# Patient Record
Sex: FEMALE | Race: WHITE | NOT HISPANIC OR LATINO | Employment: OTHER | URBAN - METROPOLITAN AREA
[De-identification: names, ages, dates, MRNs, and addresses within clinical notes are randomized per-mention and may not be internally consistent; named-entity substitution may affect disease eponyms.]

---

## 2019-10-30 DIAGNOSIS — R94.31 NONSPECIFIC ABNORMAL ELECTROCARDIOGRAM (ECG) (EKG): ICD-10-CM

## 2019-10-30 DIAGNOSIS — I44.7 COMPLETE LEFT BUNDLE BRANCH BLOCK: ICD-10-CM

## 2019-10-30 DIAGNOSIS — I10 ESSENTIAL HYPERTENSION, MALIGNANT: Primary | ICD-10-CM

## 2019-10-30 DIAGNOSIS — E78.5 HYPERLIPEMIA: ICD-10-CM

## 2019-11-07 ENCOUNTER — CLINICAL SUPPORT (OUTPATIENT)
Dept: CARDIOLOGY | Facility: HOSPITAL | Age: 69
End: 2019-11-07
Attending: INTERNAL MEDICINE
Payer: MEDICARE

## 2019-11-07 DIAGNOSIS — R94.31 NONSPECIFIC ABNORMAL ELECTROCARDIOGRAM (ECG) (EKG): ICD-10-CM

## 2019-11-07 DIAGNOSIS — I10 ESSENTIAL HYPERTENSION, MALIGNANT: ICD-10-CM

## 2019-11-07 DIAGNOSIS — E78.5 HYPERLIPEMIA: ICD-10-CM

## 2019-11-07 DIAGNOSIS — I44.7 COMPLETE LEFT BUNDLE BRANCH BLOCK: ICD-10-CM

## 2019-11-07 PROCEDURE — 93306 TTE W/DOPPLER COMPLETE: CPT

## 2019-11-12 ENCOUNTER — TELEPHONE (OUTPATIENT)
Dept: CARDIOLOGY | Facility: HOSPITAL | Age: 69
End: 2019-11-12

## 2019-11-12 RX ORDER — ATORVASTATIN CALCIUM 20 MG/1
20 TABLET, FILM COATED ORAL DAILY
COMMUNITY
End: 2022-07-20 | Stop reason: SDUPTHER

## 2019-11-12 RX ORDER — LABETALOL 100 MG/1
200 TABLET, FILM COATED ORAL 2 TIMES DAILY
COMMUNITY
End: 2021-01-06 | Stop reason: SDUPTHER

## 2019-11-12 RX ORDER — IRBESARTAN 300 MG/1
300 TABLET ORAL DAILY
COMMUNITY
End: 2021-01-06 | Stop reason: SDUPTHER

## 2019-11-12 RX ORDER — FUROSEMIDE 20 MG/1
20 TABLET ORAL DAILY
COMMUNITY
End: 2022-07-20 | Stop reason: SDUPTHER

## 2019-11-12 RX ORDER — LORATADINE 10 MG/1
10 TABLET ORAL DAILY
COMMUNITY

## 2019-11-12 RX ORDER — GUAIFENESIN 600 MG/1
1200 TABLET, EXTENDED RELEASE ORAL DAILY PRN
COMMUNITY

## 2019-11-12 RX ORDER — TRAZODONE HYDROCHLORIDE 50 MG/1
50 TABLET ORAL NIGHTLY PRN
COMMUNITY
End: 2024-01-31 | Stop reason: SDUPTHER

## 2019-11-12 RX ORDER — AZELASTINE 1 MG/ML
1 SPRAY, METERED NASAL 2 TIMES DAILY
COMMUNITY
End: 2023-08-14

## 2019-11-12 RX ORDER — POLYETHYLENE GLYCOL 3350 17 G/17G
17 POWDER, FOR SOLUTION ORAL DAILY
COMMUNITY

## 2019-11-12 RX ORDER — OMEPRAZOLE 20 MG/1
20 CAPSULE, DELAYED RELEASE ORAL DAILY
COMMUNITY
End: 2024-01-31 | Stop reason: SDUPTHER

## 2019-11-13 ENCOUNTER — CLINICAL SUPPORT (OUTPATIENT)
Dept: CARDIOLOGY | Facility: HOSPITAL | Age: 69
End: 2019-11-13
Attending: INTERNAL MEDICINE
Payer: MEDICARE

## 2019-11-13 ENCOUNTER — HOSPITAL ENCOUNTER (OUTPATIENT)
Dept: RADIOLOGY | Facility: HOSPITAL | Age: 69
Discharge: HOME | End: 2019-11-13
Attending: INTERNAL MEDICINE
Payer: MEDICARE

## 2019-11-13 DIAGNOSIS — I10 ESSENTIAL HYPERTENSION, MALIGNANT: ICD-10-CM

## 2019-11-13 DIAGNOSIS — R94.31 NONSPECIFIC ABNORMAL ELECTROCARDIOGRAM (ECG) (EKG): ICD-10-CM

## 2019-11-13 DIAGNOSIS — I44.7 COMPLETE LEFT BUNDLE BRANCH BLOCK: ICD-10-CM

## 2019-11-13 DIAGNOSIS — E78.5 HYPERLIPEMIA: ICD-10-CM

## 2019-11-13 LAB
CV PHARM DOSE: 0.4 MG
CV STRESS BASE HR: 69 BPM
DIASTOLIC BLOOD PRESSURE: 72 MMHG
OHS CV CPX 85 PERCENT MAX PREDICTED HEART RATE MALE: 125
OHS CV CPX MAX PREDICTED HEART RATE: 147
OHS CV CPX PATIENT IS FEMALE: 1
OHS CV CPX PATIENT IS MALE: 0
OHS CV CPX PEAK DIASTOLIC BLOOD PRESSURE: 60 MMHG
OHS CV CPX PEAK HEAR RATE: 106 BPM
OHS CV CPX PEAK RATE PRESSURE PRODUCT: NORMAL
OHS CV CPX PEAK SYSTOLIC BLOOD PRESSURE: 172 MMHG
OHS CV CPX PERCENT MAX PREDICTED HEART RATE ACHIEVED: 72
OHS CV CPX RATE PRESSURE PRODUCT PRESENTING: NORMAL
STRESS ECHO TARGET HR: 130 BPM
SYSTOLIC BLOOD PRESSURE: 188 MMHG

## 2019-11-13 PROCEDURE — 93017 CV STRESS TEST TRACING ONLY: CPT

## 2019-11-13 PROCEDURE — 63600175 PHARM REV CODE 636 W HCPCS: Performed by: INTERNAL MEDICINE

## 2019-11-13 PROCEDURE — A9502 TC99M TETROFOSMIN: HCPCS

## 2019-11-13 RX ORDER — REGADENOSON 0.08 MG/ML
0.4 INJECTION, SOLUTION INTRAVENOUS ONCE
Status: COMPLETED | OUTPATIENT
Start: 2019-11-13 | End: 2019-11-13

## 2019-11-13 RX ADMIN — REGADENOSON 0.4 MG: 0.08 INJECTION, SOLUTION INTRAVENOUS at 10:11

## 2019-11-22 LAB
AORTIC ROOT ANNULUS: 2.6 CM
AORTIC VALVE CUSP SEPERATION: 1.9 CM
AV INDEX (PROSTH): 0.83
AV MEAN GRADIENT: 4 MMHG
AV PEAK GRADIENT: 7 MMHG
AV VALVE AREA: 2.7 CM2
AV VELOCITY RATIO: 78.57
CV ECHO LV RWT: 0.5 CM
DOP CALC AO PEAK VEL: 1.33 M/S
DOP CALC AO VTI: 31.7 CM
DOP CALC LVOT AREA: 3.3 CM2
DOP CALC LVOT DIAMETER: 2.04 CM
DOP CALC LVOT PEAK VEL: 104.5 M/S
DOP CALC LVOT STROKE VOLUME: 85.66 CM3
DOP CALCLVOT PEAK VEL VTI: 26.22 CM
E WAVE DECELERATION TIME: 182.33 MSEC
E/A RATIO: 1.12
E/E' RATIO: 8.59 M/S
ECHO LV POSTERIOR WALL: 1.16 CM (ref 0.6–1.1)
FRACTIONAL SHORTENING: 28 % (ref 28–44)
INTERVENTRICULAR SEPTUM: 1.15 CM (ref 0.6–1.1)
IVRT: 74.59 MSEC
LEFT ATRIUM SIZE: 3.93 CM
LEFT INTERNAL DIMENSION IN SYSTOLE: 3.35 CM (ref 2.1–4)
LEFT VENTRICULAR INTERNAL DIMENSION IN DIASTOLE: 4.68 CM (ref 3.5–6)
LEFT VENTRICULAR MASS: 199.47 G
LV LATERAL E/E' RATIO: 6.64 M/S
LV SEPTAL E/E' RATIO: 12.17 M/S
MV PEAK A VEL: 0.65 M/S
MV PEAK E VEL: 0.73 M/S
PISA TR MAX VEL: 2.64 M/S
PV PEAK VELOCITY: 129.56 CM/S
RA PRESSURE: 3 MMHG
RIGHT VENTRICULAR END-DIASTOLIC DIMENSION: 225 CM
TDI LATERAL: 0.11 M/S
TDI SEPTAL: 0.06 M/S
TDI: 0.09 M/S
TR MAX PG: 28 MMHG
TV REST PULMONARY ARTERY PRESSURE: 31 MMHG

## 2019-12-26 DIAGNOSIS — Z12.31 ENCOUNTER FOR SCREENING MAMMOGRAM FOR MALIGNANT NEOPLASM OF BREAST: Primary | ICD-10-CM

## 2020-01-17 ENCOUNTER — HOSPITAL ENCOUNTER (OUTPATIENT)
Dept: RADIOLOGY | Facility: HOSPITAL | Age: 70
Discharge: HOME | End: 2020-01-17
Attending: SPECIALIST
Payer: MEDICARE

## 2020-01-17 DIAGNOSIS — Z12.31 ENCOUNTER FOR SCREENING MAMMOGRAM FOR MALIGNANT NEOPLASM OF BREAST: ICD-10-CM

## 2020-01-17 PROCEDURE — 77067 SCR MAMMO BI INCL CAD: CPT | Mod: TC,PO

## 2020-09-23 ENCOUNTER — TELEPHONE (OUTPATIENT)
Dept: CARDIOLOGY | Facility: CLINIC | Age: 70
End: 2020-09-23

## 2020-09-23 NOTE — TELEPHONE ENCOUNTER
----- Message from Norma Hirsch sent at 9/23/2020 11:54 AM CDT -----  Regarding: need today  Stress and ekg needed to send to pre admit   Need today     Fax [phone]

## 2021-01-06 ENCOUNTER — OFFICE VISIT (OUTPATIENT)
Dept: CARDIOLOGY | Facility: CLINIC | Age: 71
End: 2021-01-06
Payer: MEDICARE

## 2021-01-06 VITALS
WEIGHT: 188 LBS | RESPIRATION RATE: 16 BRPM | BODY MASS INDEX: 36.91 KG/M2 | DIASTOLIC BLOOD PRESSURE: 64 MMHG | OXYGEN SATURATION: 96 % | SYSTOLIC BLOOD PRESSURE: 124 MMHG | HEIGHT: 60 IN | HEART RATE: 67 BPM

## 2021-01-06 DIAGNOSIS — I10 ESSENTIAL HYPERTENSION: ICD-10-CM

## 2021-01-06 DIAGNOSIS — I10 HYPERTENSION, UNSPECIFIED TYPE: Primary | ICD-10-CM

## 2021-01-06 DIAGNOSIS — E78.5 DYSLIPIDEMIA: ICD-10-CM

## 2021-01-06 PROCEDURE — 99214 PR OFFICE/OUTPT VISIT, EST, LEVL IV, 30-39 MIN: ICD-10-PCS | Mod: S$GLB,,, | Performed by: INTERNAL MEDICINE

## 2021-01-06 PROCEDURE — 99214 OFFICE O/P EST MOD 30 MIN: CPT | Mod: S$GLB,,, | Performed by: INTERNAL MEDICINE

## 2021-01-06 RX ORDER — LABETALOL 100 MG/1
200 TABLET, FILM COATED ORAL 2 TIMES DAILY
Qty: 180 TABLET | Refills: 3 | Status: SHIPPED | OUTPATIENT
Start: 2021-01-06 | End: 2021-01-07

## 2021-01-06 RX ORDER — IRBESARTAN 300 MG/1
300 TABLET ORAL DAILY
Qty: 90 TABLET | Refills: 3 | Status: SHIPPED | OUTPATIENT
Start: 2021-01-06 | End: 2022-07-20 | Stop reason: SDUPTHER

## 2021-01-07 RX ORDER — LABETALOL 200 MG/1
200 TABLET, FILM COATED ORAL 2 TIMES DAILY
COMMUNITY
Start: 2020-12-16 | End: 2021-01-07

## 2021-01-08 RX ORDER — LABETALOL 200 MG/1
200 TABLET, FILM COATED ORAL 2 TIMES DAILY
Qty: 180 TABLET | Refills: 3 | Status: SHIPPED | OUTPATIENT
Start: 2021-01-08 | End: 2022-07-20 | Stop reason: SDUPTHER

## 2021-01-14 DIAGNOSIS — Z12.31 SCREENING MAMMOGRAM FOR HIGH-RISK PATIENT: Primary | ICD-10-CM

## 2021-01-22 ENCOUNTER — HOSPITAL ENCOUNTER (OUTPATIENT)
Dept: RADIOLOGY | Facility: HOSPITAL | Age: 71
Discharge: HOME | End: 2021-01-22
Attending: SPECIALIST
Payer: MEDICARE

## 2021-01-22 DIAGNOSIS — Z12.31 SCREENING MAMMOGRAM FOR HIGH-RISK PATIENT: ICD-10-CM

## 2021-01-22 PROCEDURE — 77067 SCR MAMMO BI INCL CAD: CPT | Mod: TC,PO

## 2021-07-06 ENCOUNTER — TELEPHONE (OUTPATIENT)
Dept: CARDIOLOGY | Facility: CLINIC | Age: 71
End: 2021-07-06

## 2021-07-14 ENCOUNTER — OFFICE VISIT (OUTPATIENT)
Dept: CARDIOLOGY | Facility: CLINIC | Age: 71
End: 2021-07-14
Payer: MEDICARE

## 2021-07-14 VITALS
HEIGHT: 60 IN | DIASTOLIC BLOOD PRESSURE: 60 MMHG | BODY MASS INDEX: 38.09 KG/M2 | SYSTOLIC BLOOD PRESSURE: 122 MMHG | WEIGHT: 194 LBS | HEART RATE: 60 BPM

## 2021-07-14 DIAGNOSIS — K21.9 GASTROESOPHAGEAL REFLUX DISEASE, UNSPECIFIED WHETHER ESOPHAGITIS PRESENT: ICD-10-CM

## 2021-07-14 DIAGNOSIS — R94.31 NONSPECIFIC ABNORMAL ELECTROCARDIOGRAM (ECG) (EKG): ICD-10-CM

## 2021-07-14 DIAGNOSIS — E78.5 DYSLIPIDEMIA: ICD-10-CM

## 2021-07-14 DIAGNOSIS — I10 ESSENTIAL HYPERTENSION: Primary | ICD-10-CM

## 2021-07-14 PROCEDURE — 99213 PR OFFICE/OUTPT VISIT, EST, LEVL III, 20-29 MIN: ICD-10-PCS | Mod: S$GLB,,, | Performed by: INTERNAL MEDICINE

## 2021-07-14 PROCEDURE — 99213 OFFICE O/P EST LOW 20 MIN: CPT | Mod: S$GLB,,, | Performed by: INTERNAL MEDICINE

## 2021-07-14 RX ORDER — LANOLIN ALCOHOL/MO/W.PET/CERES
400 CREAM (GRAM) TOPICAL DAILY
COMMUNITY

## 2022-01-03 ENCOUNTER — TELEPHONE (OUTPATIENT)
Dept: CARDIOLOGY | Facility: CLINIC | Age: 72
End: 2022-01-03
Payer: MEDICARE

## 2022-01-03 NOTE — TELEPHONE ENCOUNTER
----- Message from Sky Carrington sent at 1/3/2022 10:45 AM CST -----  Contact: Self  Pt would like to see Cindi WHITE instead of him. Can you guys try to change her to see his NP please. Please call pt back at [phone] to update and advise.

## 2022-01-18 ENCOUNTER — TELEPHONE (OUTPATIENT)
Dept: OTOLARYNGOLOGY | Facility: CLINIC | Age: 72
End: 2022-01-18
Payer: MEDICARE

## 2022-01-18 DIAGNOSIS — Z12.31 ENCOUNTER FOR SCREENING MAMMOGRAM FOR MALIGNANT NEOPLASM OF BREAST: Primary | ICD-10-CM

## 2022-01-19 ENCOUNTER — OFFICE VISIT (OUTPATIENT)
Dept: CARDIOLOGY | Facility: CLINIC | Age: 72
End: 2022-01-19
Payer: MEDICARE

## 2022-01-19 VITALS
BODY MASS INDEX: 37.5 KG/M2 | HEART RATE: 80 BPM | DIASTOLIC BLOOD PRESSURE: 80 MMHG | HEIGHT: 60 IN | WEIGHT: 191 LBS | SYSTOLIC BLOOD PRESSURE: 130 MMHG

## 2022-01-19 DIAGNOSIS — E78.5 DYSLIPIDEMIA: ICD-10-CM

## 2022-01-19 DIAGNOSIS — R94.31 NONSPECIFIC ABNORMAL ELECTROCARDIOGRAM (ECG) (EKG): ICD-10-CM

## 2022-01-19 DIAGNOSIS — K21.9 GASTROESOPHAGEAL REFLUX DISEASE WITHOUT ESOPHAGITIS: ICD-10-CM

## 2022-01-19 DIAGNOSIS — I10 ESSENTIAL HYPERTENSION: ICD-10-CM

## 2022-01-19 PROCEDURE — 99213 PR OFFICE/OUTPT VISIT, EST, LEVL III, 20-29 MIN: ICD-10-PCS | Mod: S$GLB,,, | Performed by: INTERNAL MEDICINE

## 2022-01-19 PROCEDURE — 99213 OFFICE O/P EST LOW 20 MIN: CPT | Mod: S$GLB,,, | Performed by: INTERNAL MEDICINE

## 2022-01-19 NOTE — ASSESSMENT & PLAN NOTE
She is taking Prilosec 20 mg daily she is also on Glycolax encouraged her to continue and be regimented.

## 2022-01-19 NOTE — ASSESSMENT & PLAN NOTE
Blood pressure is reasonably well controlled at 130/80 mmHg continue on Lasix 20 mg a day, Avapro 300 mg daily, labetalol 200 mg twice daily heart rate is 80 beats per minute should there be in need to adjust her medications she may consider increasing labetalol to 300 mg twice daily in the future however currently she is in recent range less continue the same dosing.

## 2022-01-19 NOTE — ASSESSMENT & PLAN NOTE
Maintain on low-fat low-cholesterol diet she is due to get some more blood work done the new future.  And in the meanwhile continue Lipitor 20 mg a day

## 2022-01-19 NOTE — PROGRESS NOTES
Subjective:    Patient ID:  Erlinda Reaves is a 71 y.o. female patient here for evaluation Hypertension      History of Present Illness:   Patient is here for follow-up evaluation she had a celebration of her 71th birthday plus the holidays of Christmas and new year's she did have some dietary indiscretion.  She was seen by primary care physician noted to have some elevated blood pressure recommended her to consider starting on amlodipine.  However patient was reluctant to add more medicines to her regimen she kept a record of her blood pressures since then at home recording the blood pressure has been within normal range less than 135 at all times.  Patient denies having any cough or congestion no arm neck or jaw pain and no significant shortness of breath.  Since then she has started on weight watchers diet and she lost about 4 lb over the course no swelling in the lower extremities.          Review of patient's allergies indicates:   Allergen Reactions    Doxycycline Nausea Only       Past Medical History:   Diagnosis Date    Chronic sinusitis     Hyperlipemia     Hypertension     Osteoarthritis      Past Surgical History:   Procedure Laterality Date    HYSTERECTOMY      TONSILLECTOMY       Social History     Tobacco Use    Smoking status: Never Smoker    Smokeless tobacco: Never Used   Substance Use Topics    Alcohol use: Never    Drug use: Never        Review of Systems:    As noted in HPI in addition      REVIEW OF SYSTEMS  CARDIOVASCULAR: No recent chest pain, palpitations, arm, neck, or jaw pain  RESPIRATORY: No recent fever, cough chills, SOB or congestion  : No blood in the urine  GI: No Nausea, vomiting, constipation, diarrhea, blood, or reflux.  MUSCULOSKELETAL: No myalgias  NEURO: No lightheadedness or dizziness  EYES: No Double vision, blurry, vision or headache              Objective        Vitals:    01/19/22 1346   BP: 130/80   Pulse:        LIPIDS - LAST 2   No results found for:  CHOL, HDL, LDLCALC, TRIG, CHOLHDL    CBC - LAST 2  No results found for: WBC, RBC, HGB, HCT, MCV, MCH, MCHC, RDW, PLT, MPV, GRAN, LYMPH, MONO, BASO, NRBC    CHEMISTRY & LIVER FUNCTION - LAST 2  No results found for: NA, K, CL, CO2, ANIONGAP, BUN, CREATININE, GLU, CALCIUM, PH, MG, ALBUMIN, PROT, ALKPHOS, ALT, AST, BILITOT     CARDIAC PROFILE - LAST 2  No results found for: BNP, CPK, CPKMB, LDH, TROPONINI     COAGULATION - LAST 2  No results found for: LABPT, INR, APTT    ENDOCRINE & PSA - LAST 2  No results found for: HGBA1C, MICROALBUR, TSH, PROCAL, PSA     ECHOCARDIOGRAM RESULTS  Results for orders placed in visit on 11/07/19    Echo Color Flow Doppler? Yes    Interpretation Summary  · Normal left ventricular systolic function. The estimated ejection fraction is 60%  · Normal LV diastolic function.  · Mild concentric left ventricular hypertrophy.  · No wall motion abnormalities.  · Mild mitral regurgitation.  · Mild tricuspid regurgitation.  · Normal right ventricular systolic function.  · Mild left atrial enlargement.  · The estimated PA systolic pressure is 31 mm Hg      CURRENT/PREVIOUS VISIT EKG  No results found for this or any previous visit.  No valid procedures specified.   Results for orders placed in visit on 11/13/19    Treadmill Stress Test    Interpretation Summary    The EKG portion of this study is negative for ischemia.    The patient reported no chest pain during the stress test.    Arrhythmias during stress: rare PVCs.    The blood pressure response to stress was normal.    ECG Stress Nuclear portion of this study will be reported separately.    No valid procedures specified.    PHYSICAL EXAM  CONSTITUTIONAL: Well built, well nourished in no apparent distress  NECK: no carotid bruit, no JVD  LUNGS: CTA  CHEST WALL: no tenderness  HEART: regular rate and rhythm, S1, S2 normal, no murmur, click, rub or gallop   ABDOMEN: soft, non-tender; bowel sounds normal; no masses,  no  organomegaly  EXTREMITIES: Extremities normal, no edema, no calf tenderness noted  NEURO: AAO X 3    I HAVE REVIEWED :    The vital signs, nurses notes, and all the pertinent radiology and labs.        Current Outpatient Medications   Medication Instructions    atorvastatin (LIPITOR) 20 mg, Oral, Daily    azelastine (ASTELIN) 137 mcg (0.1 %) nasal spray 1 spray, Nasal, 2 times daily    furosemide (LASIX) 20 mg, Oral, Daily    guaiFENesin (MUCINEX) 1,200 mg, Oral, Daily    irbesartan (AVAPRO) 300 mg, Oral, Daily    labetaloL (NORMODYNE) 200 mg, Oral, 2 times daily    loratadine (CLARITIN) 10 mg, Oral, Daily    magnesium oxide (MAG-OX) 400 mg, Oral, Daily    omeprazole (PRILOSEC) 20 mg, Oral, Daily    polyethylene glycol (GLYCOLAX) 17 g, Oral, Daily    traZODone (DESYREL) 50 mg, Oral, Nightly PRN          Assessment & Plan     Essential hypertension  Blood pressure is reasonably well controlled at 130/80 mmHg continue on Lasix 20 mg a day, Avapro 300 mg daily, labetalol 200 mg twice daily heart rate is 80 beats per minute should there be in need to adjust her medications she may consider increasing labetalol to 300 mg twice daily in the future however currently she is in recent range less continue the same dosing.    Dyslipidemia  Maintain on low-fat low-cholesterol diet she is due to get some more blood work done the new future.  And in the meanwhile continue Lipitor 20 mg a day    Nonspecific abnormal electrocardiogram (ECG) (EKG)  No active cardiac symptoms are noted at this time    Gastroesophageal reflux disease  She is taking Prilosec 20 mg daily she is also on Glycolax encouraged her to continue and be regimented.          Follow up in about 6 months (around 7/19/2022).

## 2022-02-14 ENCOUNTER — HOSPITAL ENCOUNTER (OUTPATIENT)
Dept: RADIOLOGY | Facility: HOSPITAL | Age: 72
Discharge: HOME | End: 2022-02-14
Attending: INTERNAL MEDICINE
Payer: MEDICARE

## 2022-02-14 ENCOUNTER — TELEPHONE (OUTPATIENT)
Dept: CARDIOLOGY | Facility: CLINIC | Age: 72
End: 2022-02-14
Payer: MEDICARE

## 2022-02-14 VITALS — WEIGHT: 190.94 LBS | HEIGHT: 60 IN | BODY MASS INDEX: 37.49 KG/M2

## 2022-02-14 DIAGNOSIS — Z12.31 ENCOUNTER FOR SCREENING MAMMOGRAM FOR MALIGNANT NEOPLASM OF BREAST: ICD-10-CM

## 2022-02-14 PROCEDURE — 77063 BREAST TOMOSYNTHESIS BI: CPT | Mod: TC,PO

## 2022-02-14 PROCEDURE — 77067 SCR MAMMO BI INCL CAD: CPT | Mod: TC,PO

## 2022-02-14 NOTE — TELEPHONE ENCOUNTER
----- Message from Leonela Frausto sent at 2/14/2022 12:22 PM CST -----  Contact: Pt  Type:  Needs Medical Advice    Who Called: Pt  Symptoms (please be specific):   How long has patient had these symptoms:    Pharmacy name and phone #:   Would the patient rather a call back or a response via [facility]? Call back  Best Call Back Number: [phone]  Additional Information: Pt called to see if her lab results from Dr Lagunas was received, pt asked for a call back

## 2022-02-15 ENCOUNTER — TELEPHONE (OUTPATIENT)
Dept: CARDIOLOGY | Facility: CLINIC | Age: 72
End: 2022-02-15
Payer: MEDICARE

## 2022-02-15 NOTE — TELEPHONE ENCOUNTER
----- Message from Ephraim Sifuentes sent at 2/15/2022  9:35 AM CST -----  Pt would like to be called back asap regarding whether or not labs had been received from Dr. Lagunas.    Pt can be reached at [phone].    Thanks

## 2022-02-15 NOTE — TELEPHONE ENCOUNTER
Spoke with pt, lab results have not been received as of yet. Gave pt our fax number. Pt verbalized understanding.

## 2022-02-21 ENCOUNTER — TELEPHONE (OUTPATIENT)
Dept: CARDIOLOGY | Facility: CLINIC | Age: 72
End: 2022-02-21
Payer: MEDICARE

## 2022-07-20 ENCOUNTER — OFFICE VISIT (OUTPATIENT)
Dept: CARDIOLOGY | Facility: CLINIC | Age: 72
End: 2022-07-20
Payer: MEDICARE

## 2022-07-20 VITALS
OXYGEN SATURATION: 98 % | HEART RATE: 56 BPM | SYSTOLIC BLOOD PRESSURE: 148 MMHG | RESPIRATION RATE: 16 BRPM | DIASTOLIC BLOOD PRESSURE: 78 MMHG | BODY MASS INDEX: 35.73 KG/M2 | WEIGHT: 182 LBS | HEIGHT: 60 IN

## 2022-07-20 DIAGNOSIS — K21.00 GASTROESOPHAGEAL REFLUX DISEASE WITH ESOPHAGITIS WITHOUT HEMORRHAGE: ICD-10-CM

## 2022-07-20 DIAGNOSIS — E78.5 DYSLIPIDEMIA: ICD-10-CM

## 2022-07-20 DIAGNOSIS — I10 ESSENTIAL HYPERTENSION: ICD-10-CM

## 2022-07-20 PROCEDURE — 99214 PR OFFICE/OUTPT VISIT, EST, LEVL IV, 30-39 MIN: ICD-10-PCS | Mod: S$GLB,,, | Performed by: INTERNAL MEDICINE

## 2022-07-20 PROCEDURE — 99214 OFFICE O/P EST MOD 30 MIN: CPT | Mod: S$GLB,,, | Performed by: INTERNAL MEDICINE

## 2022-07-20 RX ORDER — ATORVASTATIN CALCIUM 20 MG/1
20 TABLET, FILM COATED ORAL DAILY
Qty: 90 TABLET | Refills: 3 | Status: SHIPPED | OUTPATIENT
Start: 2022-07-20 | End: 2024-01-26 | Stop reason: SDUPTHER

## 2022-07-20 RX ORDER — LABETALOL 200 MG/1
200 TABLET, FILM COATED ORAL 2 TIMES DAILY
Qty: 180 TABLET | Refills: 3 | Status: SHIPPED | OUTPATIENT
Start: 2022-07-20 | End: 2024-01-26 | Stop reason: SDUPTHER

## 2022-07-20 RX ORDER — FUROSEMIDE 20 MG/1
20 TABLET ORAL DAILY
Qty: 90 TABLET | Refills: 3 | Status: SHIPPED | OUTPATIENT
Start: 2022-07-20 | End: 2023-01-17 | Stop reason: ALTCHOICE

## 2022-07-20 RX ORDER — IRBESARTAN 300 MG/1
300 TABLET ORAL DAILY
Qty: 90 TABLET | Refills: 3 | Status: SHIPPED | OUTPATIENT
Start: 2022-07-20 | End: 2024-01-26 | Stop reason: SDUPTHER

## 2022-07-20 NOTE — ASSESSMENT & PLAN NOTE
Continue on low-fat diet and local salt diet and she is on Avapro 3 00 mg daily her blood pressure is slightly elevated today.  However blood pressure this morning was 110/60.  And she is also on labetalol are 200 mg twice daily arm along with Lasix 20 mg once a day I have encouraged to continue the same keep track of her blood pressures at home in the past several weeks has been very stable less than 136. Diastolic has always been normal.  Continue present effort speech

## 2022-07-20 NOTE — PROGRESS NOTES
Subjective:    Patient ID:  Erlinda Reaves is a 71 y.o. female patient here for evaluation Hypertension      History of Present Illness:  Patient is here for follow-up evaluation she is on a careful dietary intake and she has lost about 18 lb.  She feels good denies having episodes of angina no shortness of breath PND orthopnea noted.  She remains reasonably active.  She had blood work done on January 28 blood sugar was 97 and creatinine was 0.6 liver profile was normal.    Her total cholesterol of 162 triglyceride 157 HDL was 46 and LDL C was 91.         Review of patient's allergies indicates:   Allergen Reactions    Doxycycline Nausea Only       Past Medical History:   Diagnosis Date    Chronic sinusitis     Hyperlipemia     Hypertension     Osteoarthritis      Past Surgical History:   Procedure Laterality Date    HYSTERECTOMY      TONSILLECTOMY       Social History     Tobacco Use    Smoking status: Never Smoker    Smokeless tobacco: Never Used   Substance Use Topics    Alcohol use: Never    Drug use: Never        Review of Systems:    As noted in HPI in addition      REVIEW OF SYSTEMS  CARDIOVASCULAR: No recent chest pain, palpitations, arm, neck, or jaw pain  RESPIRATORY: No recent fever, cough chills, SOB or congestion  : No blood in the urine  GI: No Nausea, vomiting, constipation, diarrhea, blood, or reflux.  MUSCULOSKELETAL: No myalgias  NEURO: No lightheadedness or dizziness  EYES: No Double vision, blurry, vision or headache              Objective        Vitals:    07/20/22 1431   BP: (!) 148/78   Pulse: (!) 56   Resp: 16       LIPIDS - LAST 2   No results found for: CHOL, HDL, LDLCALC, TRIG, CHOLHDL    CBC - LAST 2  No results found for: WBC, RBC, HGB, HCT, MCV, MCH, MCHC, RDW, PLT, MPV, GRAN, LYMPH, MONO, BASO, NRBC    CHEMISTRY & LIVER FUNCTION - LAST 2  No results found for: NA, K, CL, CO2, ANIONGAP, BUN, CREATININE, GLU, CALCIUM, PH, MG, ALBUMIN, PROT, ALKPHOS, ALT, AST, BILITOT      CARDIAC PROFILE - LAST 2  No results found for: BNP, CPK, CPKMB, LDH, TROPONINI     COAGULATION - LAST 2  No results found for: LABPT, INR, APTT    ENDOCRINE & PSA - LAST 2  No results found for: HGBA1C, MICROALBUR, TSH, PROCAL, PSA     ECHOCARDIOGRAM RESULTS  Results for orders placed in visit on 11/07/19    Echo Color Flow Doppler? Yes    Interpretation Summary  · Normal left ventricular systolic function. The estimated ejection fraction is 60%  · Normal LV diastolic function.  · Mild concentric left ventricular hypertrophy.  · No wall motion abnormalities.  · Mild mitral regurgitation.  · Mild tricuspid regurgitation.  · Normal right ventricular systolic function.  · Mild left atrial enlargement.  · The estimated PA systolic pressure is 31 mm Hg      CURRENT/PREVIOUS VISIT EKG  No results found for this or any previous visit.  No valid procedures specified.   Results for orders placed in visit on 11/13/19    Treadmill Stress Test    Interpretation Summary    The EKG portion of this study is negative for ischemia.    The patient reported no chest pain during the stress test.    Arrhythmias during stress: rare PVCs.    The blood pressure response to stress was normal.    ECG Stress Nuclear portion of this study will be reported separately.    No valid procedures specified.    PHYSICAL EXAM  CONSTITUTIONAL: Well built, well nourished in no apparent distress  NECK: no carotid bruit, no JVD  LUNGS: CTA  CHEST WALL: no tenderness  HEART: regular rate and rhythm, S1, S2 normal, no murmur, click, rub or gallop   ABDOMEN: soft, non-tender; bowel sounds normal; no masses,  no organomegaly  EXTREMITIES: Extremities normal, no edema, no calf tenderness noted  NEURO: AAO X 3    I HAVE REVIEWED :    The vital signs, nurses notes, and all the pertinent radiology and labs.        Current Outpatient Medications   Medication Instructions    atorvastatin (LIPITOR) 20 mg, Oral, Daily    azelastine (ASTELIN) 137 mcg (0.1  %) nasal spray 1 spray, Nasal, 2 times daily    furosemide (LASIX) 20 mg, Oral, Daily    guaiFENesin (MUCINEX) 1,200 mg, Oral, Daily    irbesartan (AVAPRO) 300 mg, Oral, Daily    labetaloL (NORMODYNE) 200 mg, Oral, 2 times daily    loratadine (CLARITIN) 10 mg, Oral, Daily    magnesium oxide (MAG-OX) 400 mg, Oral, Daily    omeprazole (PRILOSEC) 20 mg, Oral, Daily    polyethylene glycol (GLYCOLAX) 17 g, Oral, Daily    traZODone (DESYREL) 50 mg, Oral, Nightly PRN          Assessment & Plan     Dyslipidemia  Continue on Lipitor at 20 mg daily she appears relatively stable and tolerating this well.  She is already modified her diet with and good 18 lb weight loss.    Essential hypertension  Continue on low-fat diet and local salt diet and she is on Avapro 3 00 mg daily her blood pressure is slightly elevated today.  However blood pressure this morning was 110/60.  And she is also on labetalol are 200 mg twice daily arm along with Lasix 20 mg once a day I have encouraged to continue the same keep track of her blood pressures at home in the past several weeks has been very stable less than 136. Diastolic has always been normal.  Continue present effort speech      Gastroesophageal reflux disease  Continue on omeprazole 20 mg daily she is fairly well controlled on this.          Follow up in about 6 months (around 1/20/2023).

## 2022-07-20 NOTE — ASSESSMENT & PLAN NOTE
Continue on Lipitor at 20 mg daily she appears relatively stable and tolerating this well.  She is already modified her diet with and good 18 lb weight loss.

## 2022-08-26 ENCOUNTER — TELEPHONE (OUTPATIENT)
Dept: CARDIOLOGY | Facility: CLINIC | Age: 72
End: 2022-08-26
Payer: MEDICARE

## 2022-08-26 NOTE — TELEPHONE ENCOUNTER
----- Message from Shaye Silva sent at 8/26/2022 10:29 AM CDT -----  Regarding: referral  Contact: Patient  Patient want to know who Doctor referred her to, please call back at [phone] (home)

## 2022-09-02 ENCOUNTER — TELEPHONE (OUTPATIENT)
Dept: CARDIOLOGY | Facility: CLINIC | Age: 72
End: 2022-09-02
Payer: MEDICARE

## 2022-09-02 NOTE — TELEPHONE ENCOUNTER
----- Message from Sotero Farrar MA sent at 9/2/2022  4:05 PM CDT -----  Contact: ISIS SARMIENTO [[id number]]  Type: Needs Medical Advice    Who Called: ISIS SARMIENTO [[id number]]  Best Call Back Number: [phone]  cell [phone]  Inquiry/Question: Please call ISIS SARMIENTO [[id number]] regarding referral to MD missed call from the office          Thank you~

## 2022-09-02 NOTE — TELEPHONE ENCOUNTER
----- Message from Sotero Farrar MA sent at 9/2/2022  9:09 AM CDT -----  Contact: ISIS SARMIENTO [[id number]]  Type: Needs Medical Advice    Who Called: ISIS SARMIENTO [[id number]]  Best Call Back Number: [phone]  Inquiry/Question: Please call ISIS SARMIENTO [[id number]] regarding referral concerns for MD      Thank you~

## 2022-12-02 ENCOUNTER — TELEPHONE (OUTPATIENT)
Dept: CARDIOLOGY | Facility: CLINIC | Age: 72
End: 2022-12-02
Payer: MEDICARE

## 2022-12-02 NOTE — TELEPHONE ENCOUNTER
----- Message from Bindu Brown sent at 12/2/2022  8:27 AM CST -----  Type: Needs Medical Advice  Who Called: Pt   Symptoms (please be specific):   How long has patient had these symptoms:    Pharmacy name and phone #:    Best Call Back Number: [phone]  Additional Information: Pt requesting a call back for scheduling in [place].

## 2022-12-02 NOTE — TELEPHONE ENCOUNTER
Patient booked appt with Cecilia but if sooner appt with Dr. Puente she would rahter see him. Pt understood we would put on wait list for Dr. Puente.

## 2022-12-02 NOTE — TELEPHONE ENCOUNTER
----- Message from Shaye Silva sent at 12/2/2022 11:51 AM CST -----  Regarding: appointment  Contact: patient  Patient requesting appointment with Dr Puente only, if possible asking to reschedule her appointment for January, please call back at [phone] (home)

## 2022-12-06 ENCOUNTER — TELEPHONE (OUTPATIENT)
Dept: CARDIOLOGY | Facility: CLINIC | Age: 72
End: 2022-12-06
Payer: MEDICARE

## 2022-12-06 NOTE — TELEPHONE ENCOUNTER
She was wanting to schedule something in February. I told her we didn't have it open yet. She will call back in the next few weeks.

## 2023-01-17 ENCOUNTER — OFFICE VISIT (OUTPATIENT)
Dept: CARDIOLOGY | Facility: CLINIC | Age: 73
End: 2023-01-17
Payer: MEDICARE

## 2023-01-17 VITALS
SYSTOLIC BLOOD PRESSURE: 148 MMHG | RESPIRATION RATE: 16 BRPM | DIASTOLIC BLOOD PRESSURE: 76 MMHG | OXYGEN SATURATION: 99 % | HEIGHT: 60 IN | HEART RATE: 63 BPM | BODY MASS INDEX: 36.12 KG/M2 | WEIGHT: 184 LBS

## 2023-01-17 DIAGNOSIS — K21.00 GASTROESOPHAGEAL REFLUX DISEASE WITH ESOPHAGITIS WITHOUT HEMORRHAGE: ICD-10-CM

## 2023-01-17 DIAGNOSIS — R94.31 NONSPECIFIC ABNORMAL ELECTROCARDIOGRAM (ECG) (EKG): ICD-10-CM

## 2023-01-17 DIAGNOSIS — I10 ESSENTIAL HYPERTENSION: ICD-10-CM

## 2023-01-17 PROCEDURE — 99214 OFFICE O/P EST MOD 30 MIN: CPT | Mod: S$PBB,,, | Performed by: INTERNAL MEDICINE

## 2023-01-17 PROCEDURE — 99999 PR PBB SHADOW E&M-EST. PATIENT-LVL IV: ICD-10-PCS | Mod: PBBFAC,,, | Performed by: INTERNAL MEDICINE

## 2023-01-17 PROCEDURE — 99999 PR PBB SHADOW E&M-EST. PATIENT-LVL IV: CPT | Mod: PBBFAC,,, | Performed by: INTERNAL MEDICINE

## 2023-01-17 PROCEDURE — 99214 OFFICE O/P EST MOD 30 MIN: CPT | Mod: PBBFAC,PN | Performed by: INTERNAL MEDICINE

## 2023-01-17 PROCEDURE — 99214 PR OFFICE/OUTPT VISIT, EST, LEVL IV, 30-39 MIN: ICD-10-PCS | Mod: S$PBB,,, | Performed by: INTERNAL MEDICINE

## 2023-01-17 RX ORDER — POTASSIUM CHLORIDE 750 MG/1
10 CAPSULE, EXTENDED RELEASE ORAL DAILY
Qty: 90 CAPSULE | Refills: 3 | Status: SHIPPED | OUTPATIENT
Start: 2023-01-17 | End: 2023-08-14

## 2023-01-17 RX ORDER — TRIAMTERENE/HYDROCHLOROTHIAZID 37.5-25 MG
1 TABLET ORAL DAILY
Qty: 90 TABLET | Refills: 3 | Status: SHIPPED | OUTPATIENT
Start: 2023-01-17 | End: 2023-07-26

## 2023-01-17 NOTE — PROGRESS NOTES
Subjective:    Patient ID:  Erlinda Reaves is a 72 y.o. female patient here for evaluation Follow-up and Hypertension (Ws elevated at pcp office)      History of Present Illness:     Patient is a 72-year-old lady with history of labile attention seeking follow-up evaluation.  Reportedly she has reactive elevated of her blood pressures doctor's office each of her visits.  She was in primary care's office noted to have elevated blood pressures however she kept a diligent records of her blood pressures at home they all have been stable consistently are from every day recordings.    Denies having chest discomfort arm neck or jaw pain no significant shortness of breath is noted.  She is taking a host of supplements I have encouraged her to minimize that to absolute necessary once.  She is also diagnosed with lichen planus.        Review of patient's allergies indicates:   Allergen Reactions    Doxycycline Nausea Only       Past Medical History:   Diagnosis Date    Chronic sinusitis     Hyperlipemia     Hypertension     Osteoarthritis      Past Surgical History:   Procedure Laterality Date    HYSTERECTOMY      TONSILLECTOMY       Social History     Tobacco Use    Smoking status: Never    Smokeless tobacco: Never   Substance Use Topics    Alcohol use: Never    Drug use: Never        Review of Systems:    As noted in HPI in addition      REVIEW OF SYSTEMS  CARDIOVASCULAR: No recent chest pain, palpitations, arm, neck, or jaw pain  RESPIRATORY: No recent fever, cough chills, SOB or congestion  : No blood in the urine  GI: No Nausea, vomiting, constipation, diarrhea, blood, or reflux.  MUSCULOSKELETAL: No myalgias  NEURO: No lightheadedness or dizziness  EYES: No Double vision, blurry, vision or headache              Objective        Vitals:    01/17/23 1512   BP: (!) 148/76   Pulse:    Resp:        LIPIDS - LAST 2   No results found for: CHOL, HDL, LDLCALC, TRIG, CHOLHDL    CBC - LAST 2  No results found for: WBC, RBC,  HGB, HCT, MCV, MCH, MCHC, RDW, PLT, MPV, GRAN, LYMPH, MONO, BASO, NRBC    CHEMISTRY & LIVER FUNCTION - LAST 2  No results found for: NA, K, CL, CO2, ANIONGAP, BUN, CREATININE, GLU, CALCIUM, PH, MG, ALBUMIN, PROT, ALKPHOS, ALT, AST, BILITOT     CARDIAC PROFILE - LAST 2  No results found for: BNP, CPK, CPKMB, LDH, TROPONINI, TROPONINIHS     COAGULATION - LAST 2  No results found for: LABPT, INR, APTT    ENDOCRINE & PSA - LAST 2  No results found for: HGBA1C, MICROALBUR, TSH, PROCAL, PSA     ECHOCARDIOGRAM RESULTS  Results for orders placed in visit on 11/07/19    Echo Color Flow Doppler? Yes    Interpretation Summary  · Normal left ventricular systolic function. The estimated ejection fraction is 60%  · Normal LV diastolic function.  · Mild concentric left ventricular hypertrophy.  · No wall motion abnormalities.  · Mild mitral regurgitation.  · Mild tricuspid regurgitation.  · Normal right ventricular systolic function.  · Mild left atrial enlargement.  · The estimated PA systolic pressure is 31 mm Hg      CURRENT/PREVIOUS VISIT EKG  No results found for this or any previous visit.  No valid procedures specified.   Results for orders placed in visit on 11/13/19    Treadmill Stress Test    Interpretation Summary    The EKG portion of this study is negative for ischemia.    The patient reported no chest pain during the stress test.    Arrhythmias during stress: rare PVCs.    The blood pressure response to stress was normal.    ECG Stress Nuclear portion of this study will be reported separately.    No valid procedures specified.    PHYSICAL EXAM  CONSTITUTIONAL: Well built, well nourished in no apparent distress  NECK: no carotid bruit, no JVD  LUNGS: CTA  CHEST WALL: no tenderness  HEART: regular rate and rhythm, S1, S2 normal, no murmur, click, rub or gallop   ABDOMEN: soft, non-tender; bowel sounds normal; no masses,  no organomegaly  EXTREMITIES: Extremities normal, no edema, no calf tenderness noted  NEURO: AAO X  3    I HAVE REVIEWED :    The vital signs, nurses notes, and all the pertinent radiology and labs.        Current Outpatient Medications   Medication Instructions    atorvastatin (LIPITOR) 20 mg, Oral, Daily    azelastine (ASTELIN) 137 mcg (0.1 %) nasal spray 1 spray, Nasal, 2 times daily    guaiFENesin (MUCINEX) 1,200 mg, Oral, Daily    irbesartan (AVAPRO) 300 mg, Oral, Daily    labetaloL (NORMODYNE) 200 mg, Oral, 2 times daily    loratadine (CLARITIN) 10 mg, Oral, Daily    magnesium oxide (MAG-OX) 400 mg, Oral, Daily    omeprazole (PRILOSEC) 20 mg, Oral, Daily    polyethylene glycol (GLYCOLAX) 17 g, Oral, Daily    potassium chloride (MICRO-K) 10 MEQ CpSR 10 mEq, Oral, Daily    traZODone (DESYREL) 50 mg, Oral, Nightly PRN    triamterene-hydrochlorothiazide 37.5-25 mg (MAXZIDE-25) 37.5-25 mg per tablet 1 tablet, Oral, Daily          Assessment & Plan     Essential hypertension  Her blood pressure is somewhat labile today encouraged her to continue on labetalol 200 mg twice daily her heart rate is 63 beats per minute.  Stop the Lasix.  Avapro 300 mg daily.  Maintain on low-salt diet and continue same regimen.  I will add Maxzide 37.5/25 mg every other day.  This could be increased to daily as needed    Nonspecific abnormal electrocardiogram (ECG) (EKG)  No acute changes noted.  Encouraged her to continue on risk factor modification with Lipitor 20 mg daily her last LDL cholesterol is 91 maintain on low-fat low-cholesterol diet    Gastroesophageal reflux disease  Continue on Prilosec 20 mg daily and supplement magnesium 400 mg a day    I have encouraged her to try Maxzide 37.5/25 mg once daily along with potassium 10 mg a day.      Follow up in about 6 months (around 7/17/2023).

## 2023-02-02 DIAGNOSIS — Z12.31 ENCOUNTER FOR SCREENING MAMMOGRAM FOR MALIGNANT NEOPLASM OF BREAST: Primary | ICD-10-CM

## 2023-02-23 ENCOUNTER — HOSPITAL ENCOUNTER (OUTPATIENT)
Dept: RADIOLOGY | Facility: HOSPITAL | Age: 73
Discharge: HOME | End: 2023-02-23
Attending: SPECIALIST
Payer: MEDICARE

## 2023-02-23 DIAGNOSIS — Z12.31 ENCOUNTER FOR SCREENING MAMMOGRAM FOR MALIGNANT NEOPLASM OF BREAST: ICD-10-CM

## 2023-02-23 PROCEDURE — 77067 SCR MAMMO BI INCL CAD: CPT | Mod: TC,PO

## 2023-07-06 ENCOUNTER — TELEPHONE (OUTPATIENT)
Dept: FAMILY MEDICINE | Facility: CLINIC | Age: 73
End: 2023-07-06

## 2023-07-06 NOTE — TELEPHONE ENCOUNTER
----- Message from Ashley Puente sent at 7/6/2023  2:01 PM CDT -----  Patient called and stated that she called another doctor's office and they told her that her appointment with our office will have to be rescheduled. And she would like to know because she  will have to call another doctor if she can't be seen then. Please give her a call at [phone]

## 2023-07-21 DIAGNOSIS — R10.32 ABDOMINAL PAIN, LEFT LOWER QUADRANT: ICD-10-CM

## 2023-07-21 DIAGNOSIS — K59.00 CONSTIPATED: Primary | ICD-10-CM

## 2023-07-21 DIAGNOSIS — R10.32 LLQ ABDOMINAL PAIN: ICD-10-CM

## 2023-07-26 ENCOUNTER — OFFICE VISIT (OUTPATIENT)
Dept: CARDIOLOGY | Facility: CLINIC | Age: 73
End: 2023-07-26
Payer: MEDICARE

## 2023-07-26 VITALS
SYSTOLIC BLOOD PRESSURE: 140 MMHG | HEIGHT: 60 IN | BODY MASS INDEX: 34.95 KG/M2 | WEIGHT: 178 LBS | DIASTOLIC BLOOD PRESSURE: 70 MMHG

## 2023-07-26 DIAGNOSIS — I10 ESSENTIAL HYPERTENSION: Primary | ICD-10-CM

## 2023-07-26 DIAGNOSIS — Z00.00 ANNUAL PHYSICAL EXAM: ICD-10-CM

## 2023-07-26 DIAGNOSIS — E66.01 SEVERE OBESITY (BMI 35.0-39.9) WITH COMORBIDITY: ICD-10-CM

## 2023-07-26 DIAGNOSIS — E78.5 DYSLIPIDEMIA: ICD-10-CM

## 2023-07-26 DIAGNOSIS — K21.00 GASTROESOPHAGEAL REFLUX DISEASE WITH ESOPHAGITIS WITHOUT HEMORRHAGE: ICD-10-CM

## 2023-07-26 PROCEDURE — 99213 OFFICE O/P EST LOW 20 MIN: CPT | Mod: PBBFAC,PN | Performed by: NURSE PRACTITIONER

## 2023-07-26 PROCEDURE — 99213 PR OFFICE/OUTPT VISIT, EST, LEVL III, 20-29 MIN: ICD-10-PCS | Mod: S$PBB,,, | Performed by: NURSE PRACTITIONER

## 2023-07-26 PROCEDURE — 99999 PR PBB SHADOW E&M-EST. PATIENT-LVL III: ICD-10-PCS | Mod: PBBFAC,,, | Performed by: NURSE PRACTITIONER

## 2023-07-26 PROCEDURE — 93010 EKG 12-LEAD: ICD-10-PCS | Mod: S$PBB,,, | Performed by: INTERNAL MEDICINE

## 2023-07-26 PROCEDURE — 93010 ELECTROCARDIOGRAM REPORT: CPT | Mod: S$PBB,,, | Performed by: INTERNAL MEDICINE

## 2023-07-26 PROCEDURE — 99213 OFFICE O/P EST LOW 20 MIN: CPT | Mod: S$PBB,,, | Performed by: NURSE PRACTITIONER

## 2023-07-26 PROCEDURE — 93005 ELECTROCARDIOGRAM TRACING: CPT | Mod: PBBFAC,PN | Performed by: INTERNAL MEDICINE

## 2023-07-26 PROCEDURE — 99999 PR PBB SHADOW E&M-EST. PATIENT-LVL III: CPT | Mod: PBBFAC,,, | Performed by: NURSE PRACTITIONER

## 2023-07-26 RX ORDER — FUROSEMIDE 20 MG/1
20 TABLET ORAL DAILY PRN
COMMUNITY
End: 2024-01-26 | Stop reason: SDUPTHER

## 2023-07-26 NOTE — PROGRESS NOTES
Subjective:    Patient ID:  Erlinda Reaves is a 72 y.o. female patient here for evaluation Follow-up    History of Present Illness:     No recent CP, shortness of breath or fluid retention  Having CT abdomen this week and is worried about this  No palpitations, dizziness, PND, orthopnea (uses 2 pillows)  Lives alone, no falls, does lot of yard work and house work  BP trends at home: lowest 109/54; 137/74 highest  Couldn't tolerate Maxzide Dr BETANCUR started (felt funny, dizziness)    Most Recent Echocardiogram Results  Results for orders placed in visit on 11/07/19    Echo Color Flow Doppler? Yes    Interpretation Summary  · Normal left ventricular systolic function. The estimated ejection fraction is 60%  · Normal LV diastolic function.  · Mild concentric left ventricular hypertrophy.  · No wall motion abnormalities.  · Mild mitral regurgitation.  · Mild tricuspid regurgitation.  · Normal right ventricular systolic function.  · Mild left atrial enlargement.  · The estimated PA systolic pressure is 31 mm Hg      Most Recent Nuclear Stress Test Results  Results for orders placed in visit on 11/13/19    Treadmill Stress Test    Interpretation Summary    The EKG portion of this study is negative for ischemia.    The patient reported no chest pain during the stress test.    Arrhythmias during stress: rare PVCs.    The blood pressure response to stress was normal.    ECG Stress Nuclear portion of this study will be reported separately.      Most Recent Cardiac PET Stress Test Results  No results found for this or any previous visit.      Most Recent Cardiovascular Angiogram results  No results found for this or any previous visit.      Other Most Recent Cardiology Results  No results found for this or any previous visit.      REVIEW OF SYSTEMS: As noted in HPI   CARDIOVASCULAR: No recent chest pain, palpitations, arm/neck/jaw pain, or edema.  RESPIRATORY: No recent fever, cough, SOB.  : No blood in the urine  GI: No  "reflux, nausea, vomiting, or blood in stool.   MUSCULOSKELETAL: No falls.   NEURO: No headaches, syncope, or dizziness.  EYES: No sudden changes in vision.     Past Medical History:   Diagnosis Date    Chronic sinusitis     Hyperlipemia     Hypertension     IBS (irritable bowel syndrome)     Osteoarthritis      Past Surgical History:   Procedure Laterality Date     SECTION      COLONOSCOPY      TONSILLECTOMY       Social History     Tobacco Use    Smoking status: Never    Smokeless tobacco: Never   Substance Use Topics    Alcohol use: Never    Drug use: Never         Objective      Vitals:    23 1337   BP: (!) 140/70       LAST EKG  Results for orders placed or performed in visit on 23   IN OFFICE EKG 12-LEAD (to [place])    Collection Time: 23  1:36 PM    Narrative    Test Reason : Z00.00,    Vent. Rate : 059 BPM     Atrial Rate : 059 BPM     P-R Int : 188 ms          QRS Dur : 132 ms      QT Int : 426 ms       P-R-T Axes : 035 -47 080 degrees     QTc Int : 421 ms    Sinus bradycardia  Left bundle branch block  Abnormal ECG  No previous ECGs available  Confirmed by Tre Prasad MD ([id number]) on 2023 11:14:53 PM    Referred By:  AM           Confirmed By:Tre Prasad MD     LIPIDS - LAST 2   No results found for: "CHOL", "HDL", "LDLCALC", "TRIG", "CHOLHDL"  CARDIAC PROFILE - LAST 2  No results found for: "BNP", "CPK", "CPKMB", "LDH", "TROPONINI"   CBC - LAST 2  No results found for: "WBC", "RBC", "HGB", "HCT", "PLT"  No results found for: "LABPT", "INR", "APTT"  CHEMISTRY - LAST 2  No results found for: "NA", "K", "CL", "CO2", "ANIONGAP", "BUN", "CREATININE", "GLU", "CALCIUM", "PH", "MG", "ALBUMIN", "PROT", "ALKPHOS", "ALT", "AST", "BILITOT"   ENDOCRINE - LAST 2  No results found for: "HGBA1C", "TSH"     PHYSICAL EXAM  CONSTITUTIONAL: Well built, well nourished in no apparent distress  NECK: no carotid bruit, no JVD  LUNGS: CTA  CHEST WALL: no tenderness  HEART: regular rate and " rhythm, S1, S2 normal, no murmur, click, rub or gallop   ABDOMEN: soft, non-tender; bowel sounds normal; no masses,  no organomegaly  EXTREMITIES: Extremities normal, no edema, no calf tenderness noted  NEURO: AAO X 3    I HAVE REVIEWED :    The vital signs, most recent cardiac testing, and most recent pertinent non-cardiology provider notes.    Current Outpatient Medications   Medication Instructions    atorvastatin (LIPITOR) 20 mg, Oral, Daily    cholecalciferol (vitamin D3) (VITAMIN D3) 5,000 Units, Oral, Daily    cinnamon bark (CINNAMON) 500 mg, Oral, Daily    clobetasol 0.05% (TEMOVATE) 0.05 % Oint 1 Application, Topical, Daily    coenzyme Q10 (COQ-10) 100 mg, Oral, Daily    diphenoxylate-atropine 2.5-0.025 mg (LOMOTIL) 2.5-0.025 mg per tablet 1 tablet, Oral, 4 times daily    fluticasone propionate (FLONASE) 50 mcg/actuation nasal spray 1 spray, Each Nostril, 2 times daily    furosemide (LASIX) 20 mg, Oral, Daily PRN    garlic 100 mg Tab 1 tablet, Oral, Daily    guaiFENesin (MUCINEX) 1,200 mg, Oral, Daily PRN    irbesartan (AVAPRO) 300 mg, Oral, Daily    labetaloL (NORMODYNE) 200 mg, Oral, 2 times daily    LIDOcaine HCl-hydrocortison ac (LIDAMANTLE-HC) 3-0.5 % topical cream 1 packet, Topical (Top), 2 times daily    loratadine (CLARITIN) 10 mg, Oral, Daily    magnesium oxide (MAG-OX) 400 mg, Oral, Daily    multivitamin (ONE DAILY MULTIVITAMIN) per tablet 1 tablet, Oral, Daily    omeprazole (PRILOSEC) 20 mg, Oral, Daily    ondansetron (ZOFRAN-ODT) 4 mg, Oral, Every 6 hours PRN    polyethylene glycol (GLYCOLAX) 17 g, Oral, Daily    promethazine (PHENERGAN) 12.5 MG Tab 1 tablet, Oral, Daily PRN    traZODone (DESYREL) 50 mg, Oral, Nightly PRN    triamcinolone acetonide 0.1% (KENALOG) 1 g, Topical (Top), 2 times daily      Assessment & Plan     Essential hypertension  /58 mmHg. States unable to tolerate maxzide  Continue Irbesartan 300 mg daily, Normodyne 200 mg BID, low sodium diet    Dyslipidemia  Continue  Lipitor and low cholesterol diet    Gastroesophageal reflux disease  omeprazole

## 2023-07-27 ENCOUNTER — TELEPHONE (OUTPATIENT)
Dept: CARDIOLOGY | Facility: CLINIC | Age: 73
End: 2023-07-27
Payer: MEDICARE

## 2023-07-27 NOTE — TELEPHONE ENCOUNTER
----- Message from Milo Terrell sent at 7/27/2023  9:19 AM CDT -----  Regarding: med question  Contact: leticia at [phone]  Type: Needs Medical Advice    Who Called:  Leticia    Best Call Back Number: [phone]    Additional Information: pt was seen yesterday and has question about meds. Pt took night meds at 2am and was told to take meds at 9a and 9p. Wants to know if should take 9a meds now b/c did not take night meds last night until 2a. Needs to know if too soon or if should take to get on schedule.

## 2023-07-27 NOTE — TELEPHONE ENCOUNTER
----- Message from Ralph Mendez sent at 7/27/2023  9:52 AM CDT -----  Contact: Self  Type:  Patient Returning Call    Who Called:  Patient  Who Left Message for Patient:  Darlene  Does the patient know what this is regarding?:  Yes  Best Call Back Number:  [phone]   Additional Information:   Called with additional questions for CT. Please call.

## 2023-07-27 NOTE — TELEPHONE ENCOUNTER
S/w patient and janell and she stated to take it at 8 am and 8 pm and hold mornign meds this morning and resume tonight

## 2023-07-28 ENCOUNTER — HOSPITAL ENCOUNTER (OUTPATIENT)
Dept: RADIOLOGY | Facility: HOSPITAL | Age: 73
Discharge: HOME | End: 2023-07-28
Attending: INTERNAL MEDICINE
Payer: MEDICARE

## 2023-07-28 DIAGNOSIS — K59.00 CONSTIPATED: ICD-10-CM

## 2023-07-28 DIAGNOSIS — R10.32 LLQ ABDOMINAL PAIN: ICD-10-CM

## 2023-07-28 LAB
CREAT SERPL-MCNC: 0.7 MG/DL (ref 0.5–1.4)
SAMPLE: NORMAL

## 2023-07-28 PROCEDURE — 25500020 PHARM REV CODE 255: Mod: PO | Performed by: INTERNAL MEDICINE

## 2023-07-28 PROCEDURE — 82565 ASSAY OF CREATININE: CPT | Mod: PO

## 2023-07-28 RX ADMIN — IOHEXOL 100 ML: 350 INJECTION, SOLUTION INTRAVENOUS at 02:07

## 2023-08-14 ENCOUNTER — OFFICE VISIT (OUTPATIENT)
Dept: FAMILY MEDICINE | Facility: CLINIC | Age: 73
End: 2023-08-14
Payer: MEDICARE

## 2023-08-14 ENCOUNTER — TELEPHONE (OUTPATIENT)
Dept: FAMILY MEDICINE | Facility: CLINIC | Age: 73
End: 2023-08-14

## 2023-08-14 VITALS
BODY MASS INDEX: 35.73 KG/M2 | OXYGEN SATURATION: 98 % | TEMPERATURE: 98 F | HEART RATE: 59 BPM | WEIGHT: 182 LBS | HEIGHT: 60 IN | SYSTOLIC BLOOD PRESSURE: 130 MMHG | DIASTOLIC BLOOD PRESSURE: 58 MMHG

## 2023-08-14 DIAGNOSIS — K58.1 IRRITABLE BOWEL SYNDROME WITH CONSTIPATION: ICD-10-CM

## 2023-08-14 DIAGNOSIS — I10 ESSENTIAL HYPERTENSION: Primary | ICD-10-CM

## 2023-08-14 PROBLEM — K58.9 IBS (IRRITABLE BOWEL SYNDROME): Status: ACTIVE | Noted: 2023-08-14

## 2023-08-14 PROCEDURE — 99202 OFFICE O/P NEW SF 15 MIN: CPT | Mod: S$GLB,,, | Performed by: INTERNAL MEDICINE

## 2023-08-14 PROCEDURE — 99202 PR OFFICE/OUTPT VISIT, NEW, LEVL II, 15-29 MIN: ICD-10-PCS | Mod: S$GLB,,, | Performed by: INTERNAL MEDICINE

## 2023-08-14 RX ORDER — MULTIVITAMIN
1 TABLET ORAL DAILY
COMMUNITY

## 2023-08-14 RX ORDER — GARLIC 100 MG
1 TABLET ORAL DAILY
COMMUNITY

## 2023-08-14 RX ORDER — ONDANSETRON 4 MG/1
4 TABLET, ORALLY DISINTEGRATING ORAL EVERY 6 HOURS PRN
COMMUNITY
Start: 2023-08-10 | End: 2024-01-31

## 2023-08-14 RX ORDER — PROMETHAZINE HYDROCHLORIDE 12.5 MG/1
1 TABLET ORAL DAILY PRN
COMMUNITY
Start: 2023-07-25

## 2023-08-14 RX ORDER — FLUTICASONE PROPIONATE 50 MCG
1 SPRAY, SUSPENSION (ML) NASAL 2 TIMES DAILY
COMMUNITY
Start: 2023-06-07 | End: 2024-01-31 | Stop reason: SDUPTHER

## 2023-08-14 RX ORDER — DIPHENOXYLATE HYDROCHLORIDE AND ATROPINE SULFATE 2.5; .025 MG/1; MG/1
1 TABLET ORAL 4 TIMES DAILY
COMMUNITY
Start: 2023-08-10 | End: 2024-01-31

## 2023-08-14 RX ORDER — ACETAMINOPHEN 500 MG
5000 TABLET ORAL DAILY
COMMUNITY

## 2023-08-14 RX ORDER — EPINEPHRINE 0.22MG
100 AEROSOL WITH ADAPTER (ML) INHALATION DAILY
COMMUNITY

## 2023-08-14 RX ORDER — CLOBETASOL PROPIONATE 0.5 MG/G
1 OINTMENT TOPICAL DAILY
COMMUNITY
Start: 2023-01-02

## 2023-08-14 RX ORDER — PHENYLEPHRINE HCL 10 MG
500 TABLET ORAL DAILY
COMMUNITY

## 2023-08-14 RX ORDER — TRIAMCINOLONE ACETONIDE 1 MG/G
1 CREAM TOPICAL 2 TIMES DAILY
COMMUNITY
Start: 2023-05-25 | End: 2024-01-31

## 2023-08-14 RX ORDER — LIDOCAINE HYDROCHLORIDE AND HYDROCORTISONE ACETATE 30; 5 MG/G; MG/G
1 CREAM TOPICAL 2 TIMES DAILY
COMMUNITY
Start: 2023-07-20

## 2023-08-14 NOTE — TELEPHONE ENCOUNTER
----- Message from Janet Shankar sent at 8/14/2023  3:31 PM CDT -----  Regarding: Information  Patient would like to know what otc vitamin Dr. Hopkins told her to take. Patient would like a call back

## 2023-08-14 NOTE — PROGRESS NOTES
Subjective:       Patient ID: Erlinda Reaves is a 72 y.o. female.    Chief Complaint: Establish Care (New patient establishment (IBS))    Here to establish care.  She has been seeing previous PCP for years but his practice is at [facility] and it takes her an hour to get there so she decided to establish with someone closer.    Hypertension  This is a chronic problem. The problem is controlled (100s-110s/50s). Pertinent negatives include no chest pain, headaches, neck pain, palpitations or shortness of breath. Past treatments include angiotensin blockers, beta blockers and diuretics. The current treatment provides moderate improvement.     Review of Systems   Constitutional:  Negative for activity change, appetite change, chills, diaphoresis, fatigue, fever and unexpected weight change.   HENT:  Negative for congestion, ear discharge, ear pain, hearing loss, mouth sores, nosebleeds, postnasal drip, rhinorrhea, sinus pressure, sinus pain, sneezing, sore throat, tinnitus, trouble swallowing and voice change.    Eyes:  Negative for photophobia, pain, discharge, redness, itching and visual disturbance.   Respiratory:  Negative for apnea, cough, choking, chest tightness, shortness of breath and wheezing.    Cardiovascular:  Negative for chest pain, palpitations and leg swelling.   Gastrointestinal:  Positive for anal bleeding. Negative for abdominal distention, abdominal pain, blood in stool, constipation, diarrhea, nausea and vomiting.   Endocrine: Negative for cold intolerance, heat intolerance, polydipsia and polyuria.   Genitourinary:  Negative for decreased urine volume, difficulty urinating, dyspareunia, dysuria, enuresis, frequency, genital sores, hematuria, menstrual problem, pelvic pain, urgency, vaginal bleeding, vaginal discharge and vaginal pain.   Musculoskeletal:  Negative for arthralgias, back pain, gait problem, joint swelling, myalgias, neck pain and neck stiffness.   Skin:  Negative for rash and wound.    Allergic/Immunologic: Negative for environmental allergies, food allergies and immunocompromised state.   Neurological:  Negative for dizziness, tremors, seizures, syncope, facial asymmetry, speech difficulty, weakness, light-headedness, numbness and headaches.   Hematological:  Negative for adenopathy. Bruises/bleeds easily.   Psychiatric/Behavioral:  Negative for confusion, decreased concentration, hallucinations, self-injury, sleep disturbance and suicidal ideas. The patient is not nervous/anxious.        Past Medical History:   Diagnosis Date    Chronic sinusitis     Hyperlipemia     Hypertension     IBS (irritable bowel syndrome)     Osteoarthritis       Past Surgical History:   Procedure Laterality Date     SECTION      COLONOSCOPY      TONSILLECTOMY         Family History   Problem Relation Age of Onset    Hypertension Sister     Hypertension Sister     Breast cancer Neg Hx     Colon cancer Neg Hx        Social History     Socioeconomic History    Marital status:     Number of children: 1   Occupational History    Occupation: retired   Tobacco Use    Smoking status: Never    Smokeless tobacco: Never   Substance and Sexual Activity    Alcohol use: Never    Drug use: Never    Sexual activity: Not Currently   Social History Narrative    Live alone       Current Outpatient Medications   Medication Sig Dispense Refill    atorvastatin (LIPITOR) 20 MG tablet Take 1 tablet (20 mg total) by mouth once daily. 90 tablet 3    cholecalciferol, vitamin D3, (VITAMIN D3) 125 mcg (5,000 unit) Tab Take 5,000 Units by mouth once daily.      cinnamon bark (CINNAMON) 500 mg capsule Take 500 mg by mouth once daily.      clobetasol 0.05% (TEMOVATE) 0.05 % Oint Apply 1 Application topically Daily.      coenzyme Q10 (COQ-10) 100 mg capsule Take 100 mg by mouth once daily.      diphenoxylate-atropine 2.5-0.025 mg (LOMOTIL) 2.5-0.025 mg per tablet Take 1 tablet by mouth 4 (four) times daily.      fluticasone propionate  (FLONASE) 50 mcg/actuation nasal spray 1 spray by Each Nostril route 2 (two) times daily.      furosemide (LASIX) 20 MG tablet Take 20 mg by mouth daily as needed.      garlic 100 mg Tab Take 1 tablet by mouth Daily.      guaiFENesin (MUCINEX) 600 mg 12 hr tablet Take 1,200 mg by mouth daily as needed.      irbesartan (AVAPRO) 300 MG tablet Take 1 tablet (300 mg total) by mouth once daily. 90 tablet 3    labetaloL (NORMODYNE) 200 MG tablet Take 1 tablet (200 mg total) by mouth 2 (two) times daily. 180 tablet 3    LIDOcaine HCl-hydrocortison ac (LIDAMANTLE-HC) 3-0.5 % topical cream Apply 1 packet topically 2 (two) times a day.      loratadine (CLARITIN) 10 mg tablet Take 10 mg by mouth once daily.      magnesium oxide (MAG-OX) 400 mg (241.3 mg magnesium) tablet Take 400 mg by mouth once daily.      multivitamin (ONE DAILY MULTIVITAMIN) per tablet Take 1 tablet by mouth once daily.      omeprazole (PRILOSEC) 20 MG capsule Take 20 mg by mouth once daily.      ondansetron (ZOFRAN-ODT) 4 MG TbDL Take 4 mg by mouth every 6 (six) hours as needed.      polyethylene glycol (GLYCOLAX) 17 gram/dose powder Take 17 g by mouth once daily.      promethazine (PHENERGAN) 12.5 MG Tab Take 1 tablet by mouth daily as needed.      traZODone (DESYREL) 50 MG tablet Take 50 mg by mouth nightly as needed for Insomnia.      triamcinolone acetonide 0.1% (KENALOG) 0.1 % cream Apply 1 g topically 2 (two) times daily.       No current facility-administered medications for this visit.       Review of patient's allergies indicates:   Allergen Reactions    Doxycycline Nausea Only     Objective:    HPI     Establish Care     Additional comments: New patient establishment (IBS)          Last edited by Jennifer Deluca MA on 8/14/2023 11:05 AM.      Blood pressure (!) 130/58, pulse (!) 59, temperature 98.1 °F (36.7 °C), temperature source Temporal, height 5' (1.524 m), weight 82.6 kg (182 lb), SpO2 98 %. Body mass index is 35.54 kg/m².   Physical  Exam  Vitals and nursing note reviewed.   Constitutional:       General: She is not in acute distress.     Appearance: She is well-developed. She is obese. She is not ill-appearing, toxic-appearing or diaphoretic.   HENT:      Head: Normocephalic and atraumatic.   Eyes:      General: No scleral icterus.        Right eye: No discharge.         Left eye: No discharge.      Conjunctiva/sclera: Conjunctivae normal.   Neck:      Vascular: No carotid bruit.   Cardiovascular:      Rate and Rhythm: Normal rate and regular rhythm.      Heart sounds: Normal heart sounds. No murmur heard.  Pulmonary:      Effort: Pulmonary effort is normal. No respiratory distress.      Breath sounds: Normal breath sounds. No decreased breath sounds, wheezing, rhonchi or rales.   Abdominal:      General: There is no distension.      Palpations: Abdomen is soft.      Tenderness: There is no abdominal tenderness. There is no guarding or rebound.   Musculoskeletal:      Right lower leg: No edema.      Left lower leg: No edema.   Skin:     General: Skin is warm and dry.   Neurological:      Mental Status: She is alert.      Motor: No tremor.   Psychiatric:         Mood and Affect: Mood normal.         Speech: Speech normal.         Behavior: Behavior normal.             Assessment:       1. Essential hypertension    2. Irritable bowel syndrome with constipation        Plan:       Erlinda was seen today for establish care.    Diagnoses and all orders for this visit:    Essential hypertension    Irritable bowel syndrome with constipation

## 2023-08-18 PROBLEM — E66.01 SEVERE OBESITY (BMI 35.0-39.9) WITH COMORBIDITY: Status: ACTIVE | Noted: 2023-08-18

## 2023-08-19 NOTE — ASSESSMENT & PLAN NOTE
/58 mmHg. States unable to tolerate maxzide  Continue Irbesartan 300 mg daily, Normodyne 200 mg BID, low sodium diet

## 2023-09-11 LAB — CRC RECOMMENDATION EXT: NORMAL

## 2023-09-20 DIAGNOSIS — Z78.0 MENOPAUSE: ICD-10-CM

## 2023-10-02 ENCOUNTER — TELEPHONE (OUTPATIENT)
Dept: CARDIOLOGY | Facility: CLINIC | Age: 73
End: 2023-10-02
Payer: MEDICARE

## 2023-10-02 NOTE — TELEPHONE ENCOUNTER
----- Message from Nataliia Zepeda, Patient Care Assistant sent at 10/2/2023 12:44 PM CDT -----  Regarding: advice  Contact: pt  Type: Needs Medical Advice    Who Called:  pt     Best Call Back Number: [phone] (home)     Additional Information: pt states she would like a callback regarding an unknown medication. Please call pt to advise. Thanks!

## 2023-10-02 NOTE — TELEPHONE ENCOUNTER
----- Message from Nataliia Zepeda, Patient Care Assistant sent at 10/2/2023 12:44 PM CDT -----  Regarding: advice  Contact: pt  Type: Needs Medical Advice    Who Called:  pt     Best Call Back Number: [phone] (home)     Additional Information: pt states she would like a callback regarding an unknown medication. Please call pt to advise. Thanks!

## 2023-10-04 ENCOUNTER — TELEPHONE (OUTPATIENT)
Dept: CARDIOLOGY | Facility: CLINIC | Age: 73
End: 2023-10-04
Payer: MEDICARE

## 2023-10-04 NOTE — TELEPHONE ENCOUNTER
----- Message from Lindy Byrd, Patient Care Assistant sent at 10/4/2023  3:45 PM CDT -----  Contact: self  Pt is calling to speak alok/ Urmila [phone]  thanks

## 2023-10-04 NOTE — TELEPHONE ENCOUNTER
Norvasc 2.5mg  was given by her new PCP. -  He also wanted to increase Labetalol to an extra 100mg, she would like to know if Dr Puente is okay with that change or should they change it to something else.

## 2023-10-04 NOTE — TELEPHONE ENCOUNTER
----- Message from Bora Terrazas sent at 10/4/2023  3:59 PM CDT -----  Type: Need Medical Advice   Who Called: Patient  Best callback number: [phone]  Additional Information: Patient called stated her BP is up, she was told she need to increase her medication and she does not want to be on any extra medication  Please call to further assist, Thanks.

## 2023-10-04 NOTE — TELEPHONE ENCOUNTER
PER DR. JOHNSON:    CHECK BP 3-4 TIMES A WEEK, FOLLOW WHAT PCP PRESCRIBED.     SHE IS NERVOUS HER BP WAS ONLY 140 (TOP NUMBER)    ADVISED TO CONTINUE ON HER CURRENT MEDICATIONS. SHE IS ESTABLISHING WITH A NEW PCP LOCALLY.    SHE IS GOING TO MONITOR HER PRESSURES AND LET OUR OFFICE KNOW HOW IT IS RUNNING

## 2023-12-18 ENCOUNTER — OFFICE VISIT (OUTPATIENT)
Dept: URGENT CARE | Facility: CLINIC | Age: 73
End: 2023-12-18
Payer: MEDICARE

## 2023-12-18 VITALS
DIASTOLIC BLOOD PRESSURE: 75 MMHG | HEART RATE: 77 BPM | TEMPERATURE: 98 F | BODY MASS INDEX: 36.32 KG/M2 | HEIGHT: 60 IN | SYSTOLIC BLOOD PRESSURE: 185 MMHG | OXYGEN SATURATION: 99 % | RESPIRATION RATE: 16 BRPM | WEIGHT: 185 LBS

## 2023-12-18 DIAGNOSIS — T78.40XA ALLERGIC REACTION, INITIAL ENCOUNTER: Primary | ICD-10-CM

## 2023-12-18 PROCEDURE — 96372 THER/PROPH/DIAG INJ SC/IM: CPT | Mod: S$GLB,,, | Performed by: NURSE PRACTITIONER

## 2023-12-18 PROCEDURE — 99215 OFFICE O/P EST HI 40 MIN: CPT | Mod: 25,S$GLB,, | Performed by: NURSE PRACTITIONER

## 2023-12-18 PROCEDURE — 99215 PR OFFICE/OUTPT VISIT, EST, LEVL V, 40-54 MIN: ICD-10-PCS | Mod: 25,S$GLB,, | Performed by: NURSE PRACTITIONER

## 2023-12-18 PROCEDURE — 96372 PR INJECTION,THERAP/PROPH/DIAG2ST, IM OR SUBCUT: ICD-10-PCS | Mod: S$GLB,,, | Performed by: NURSE PRACTITIONER

## 2023-12-18 RX ORDER — FAMOTIDINE 20 MG/1
40 TABLET, FILM COATED ORAL
Status: COMPLETED | OUTPATIENT
Start: 2023-12-18 | End: 2023-12-18

## 2023-12-18 RX ORDER — PREDNISONE 10 MG/1
10 TABLET ORAL DAILY
Qty: 3 TABLET | Refills: 0 | Status: SHIPPED | OUTPATIENT
Start: 2023-12-19 | End: 2023-12-22

## 2023-12-18 RX ORDER — DEXAMETHASONE SODIUM PHOSPHATE 4 MG/ML
12 INJECTION, SOLUTION INTRA-ARTICULAR; INTRALESIONAL; INTRAMUSCULAR; INTRAVENOUS; SOFT TISSUE
Status: COMPLETED | OUTPATIENT
Start: 2023-12-18 | End: 2023-12-18

## 2023-12-18 RX ORDER — DIPHENHYDRAMINE HCL 12.5MG/5ML
12.5 ELIXIR ORAL
Status: COMPLETED | OUTPATIENT
Start: 2023-12-18 | End: 2023-12-18

## 2023-12-18 RX ADMIN — FAMOTIDINE 40 MG: 20 TABLET, FILM COATED ORAL at 03:12

## 2023-12-18 RX ADMIN — DEXAMETHASONE SODIUM PHOSPHATE 12 MG: 4 INJECTION, SOLUTION INTRA-ARTICULAR; INTRALESIONAL; INTRAMUSCULAR; INTRAVENOUS; SOFT TISSUE at 03:12

## 2023-12-18 RX ADMIN — Medication 12.5 MG: at 03:12

## 2023-12-18 NOTE — PROGRESS NOTES
Subjective:      Patient ID: Erlinda Reaves is a 73 y.o. female.    Vitals:  height is 5' (1.524 m) and weight is 83.9 kg (185 lb). Her temperature is 97.6 °F (36.4 °C). Her blood pressure is 185/75 (abnormal) and her pulse is 77. Her respiration is 16 and oxygen saturation is 99%.     Chief Complaint: Allergic Reaction    Pt having an allergic reaction. Pt states she ate some walnuts then itched her eye and then her eyes started swelling, watering, and itching and pt is having trouble swallowing.     Allergic Reaction  This is a new problem. The current episode started today. Pertinent negatives include no coughing, stridor, trouble swallowing or wheezing.       Constitution: Negative for chills and fever.   HENT:  Positive for facial swelling. Negative for tongue pain, facial trauma and trouble swallowing.    Respiratory:  Negative for cough, shortness of breath, stridor and wheezing.       Objective:     Physical Exam   HENT:   Nose: No congestion.   Mouth/Throat: Mucous membranes are moist. Oropharynx is clear.   Neck: Neck supple.   Cardiovascular: Normal rate, regular rhythm, normal heart sounds and normal pulses.   Pulmonary/Chest: Effort normal and breath sounds normal.   Abdominal: Normal appearance.   Neurological: She is alert.   Vitals reviewed.      Assessment:     1. Allergic reaction, initial encounter        Plan:       Allergic reaction, initial encounter    Other orders  -     dexAMETHasone injection 12 mg  -     diphenhydrAMINE 12.5 mg/5 mL elixir 12.5 mg  -     famotidine tablet 40 mg  -     predniSONE (DELTASONE) 10 MG tablet; Take 1 tablet (10 mg total) by mouth once daily. for 3 days  Dispense: 3 tablet; Refill: 0                  Allergic reaction dexamethasone, Benadryl low-dose, and famotidine.  Steroids start tomorrow.    No evidence of airway compromise.  Urgently triaged.   In excessive of 40 minutes total time spent for evaluation and management services. Time included elements of the  following: time spent preparing to see patient, obtaining and reviewing separately obtained history, exam, evaluation, counseling and education of patient/family member or care giver, documenting in the HMR, independently interpreting results and communication of results, coordination of care ordering medications, tests, or procedures, referral and communication to other health care professionals.    Patient Instructions   Seek emergent care for trouble breathing, shortness of breath, wheezing, or trouble swallowing.   Monitor blood pressure daily and call/follow up with PCP for BP >155/95.     Take claritin daily.

## 2024-01-26 ENCOUNTER — OFFICE VISIT (OUTPATIENT)
Dept: CARDIOLOGY | Facility: CLINIC | Age: 74
End: 2024-01-26
Payer: MEDICARE

## 2024-01-26 VITALS
HEART RATE: 58 BPM | BODY MASS INDEX: 36.13 KG/M2 | OXYGEN SATURATION: 98 % | SYSTOLIC BLOOD PRESSURE: 140 MMHG | WEIGHT: 185 LBS | DIASTOLIC BLOOD PRESSURE: 82 MMHG

## 2024-01-26 DIAGNOSIS — E78.5 DYSLIPIDEMIA: Primary | ICD-10-CM

## 2024-01-26 DIAGNOSIS — I10 ESSENTIAL HYPERTENSION: ICD-10-CM

## 2024-01-26 PROCEDURE — 99212 OFFICE O/P EST SF 10 MIN: CPT | Mod: PBBFAC,PN | Performed by: NURSE PRACTITIONER

## 2024-01-26 PROCEDURE — 99999 PR PBB SHADOW E&M-EST. PATIENT-LVL II: CPT | Mod: PBBFAC,,, | Performed by: NURSE PRACTITIONER

## 2024-01-26 PROCEDURE — 99214 OFFICE O/P EST MOD 30 MIN: CPT | Mod: S$PBB,,, | Performed by: NURSE PRACTITIONER

## 2024-01-26 RX ORDER — ATORVASTATIN CALCIUM 20 MG/1
20 TABLET, FILM COATED ORAL DAILY
Qty: 90 TABLET | Refills: 3 | Status: SHIPPED | OUTPATIENT
Start: 2024-01-26 | End: 2025-01-25

## 2024-01-26 RX ORDER — LABETALOL 200 MG/1
200 TABLET, FILM COATED ORAL 2 TIMES DAILY
Qty: 180 TABLET | Refills: 3 | Status: SHIPPED | OUTPATIENT
Start: 2024-01-26

## 2024-01-26 RX ORDER — IRBESARTAN 300 MG/1
300 TABLET ORAL DAILY
Qty: 90 TABLET | Refills: 3 | Status: SHIPPED | OUTPATIENT
Start: 2024-01-26

## 2024-01-26 RX ORDER — FUROSEMIDE 20 MG/1
20 TABLET ORAL DAILY PRN
Qty: 90 TABLET | Refills: 1 | Status: SHIPPED | OUTPATIENT
Start: 2024-01-26

## 2024-01-26 NOTE — PROGRESS NOTES
Subjective:    Patient ID:  Erlinda Reaves is a 73 y.o. female who presents for follow-up .   Chief Complaint   Patient presents with    Hypertension       HPI:      Erlinda Reaves is here for follow-up visit. Denies chest pain or shortness of breath. Denies recent fever cough chills or congestion. Denies blood in the urine or blood in the stool.  Denies myalgias. Denies orthopnea or peripheral edema. Denies nausea vomiting or dyspepsia. No recent arm neck or jaw pain. No lightheadedness or dizziness. Blood pressure at home stable.  She is going to watch her salt and get on weight watchers.      Review of patient's allergies indicates:   Allergen Reactions    Doxycycline Nausea Only       Past Medical History:   Diagnosis Date    Chronic sinusitis     Hyperlipemia     Hypertension     IBS (irritable bowel syndrome)     Osteoarthritis      Past Surgical History:   Procedure Laterality Date     SECTION      COLONOSCOPY      TONSILLECTOMY       Social History     Tobacco Use    Smoking status: Never    Smokeless tobacco: Never   Substance Use Topics    Alcohol use: Never    Drug use: Never     Family History   Problem Relation Age of Onset    Hypertension Sister     Hypertension Sister     Breast cancer Neg Hx     Colon cancer Neg Hx         Review of Systems:   Constitution: Negative for diaphoresis and fever.   HEENT: Negative for nosebleeds.    Cardiovascular: Negative for chest pain       No dyspnea on exertion       No leg swelling        No palpitations  Respiratory: Negative for shortness of breath and wheezing.    Hematologic/Lymphatic: Negative for bleeding problem. Does not bruise/bleed easily.   Skin: Negative for color change and rash.   Musculoskeletal: Negative for falls and myalgias.   Gastrointestinal: Negative for hematemesis and hematochezia.   Genitourinary: Negative for hematuria.   Neurological: Negative for dizziness and light-headedness.   Psychiatric/Behavioral: Negative for altered  "mental status and memory loss.          Objective:        Vitals:    01/26/24 1439   BP: (!) 140/82   Pulse: (!) 58       No results found for: "WBC", "HGB", "HCT", "PLT", "CHOL", "TRIG", "HDL", "LDLDIRECT", "ALT", "AST", "NA", "K", "CL", "CREATININE", "BUN", "CO2", "TSH", "PSA", "INR", "GLUF", "HGBA1C", "MICROALBUR"     ECHOCARDIOGRAM RESULTS  Results for orders placed in visit on 11/07/19    Echo Color Flow Doppler? Yes    Interpretation Summary  · Normal left ventricular systolic function. The estimated ejection fraction is 60%  · Normal LV diastolic function.  · Mild concentric left ventricular hypertrophy.  · No wall motion abnormalities.  · Mild mitral regurgitation.  · Mild tricuspid regurgitation.  · Normal right ventricular systolic function.  · Mild left atrial enlargement.  · The estimated PA systolic pressure is 31 mm Hg        CURRENT/PREVIOUS VISIT EKG  Results for orders placed or performed in visit on 07/26/23   IN OFFICE EKG 12-LEAD (to [place])    Collection Time: 07/26/23  1:36 PM    Narrative    Test Reason : Z00.00,    Vent. Rate : 059 BPM     Atrial Rate : 059 BPM     P-R Int : 188 ms          QRS Dur : 132 ms      QT Int : 426 ms       P-R-T Axes : 035 -47 080 degrees     QTc Int : 421 ms    Sinus bradycardia  Left bundle branch block  Abnormal ECG  No previous ECGs available  Confirmed by Osmar LI, Tre Hloder ([id number]) on 7/26/2023 11:14:53 PM    Referred By:  AM           Confirmed By:Tre Prasad MD     No valid procedures specified.   Results for orders placed in visit on 11/13/19    Treadmill Stress Test    Interpretation Summary    The EKG portion of this study is negative for ischemia.    The patient reported no chest pain during the stress test.    Arrhythmias during stress: rare PVCs.    The blood pressure response to stress was normal.    ECG Stress Nuclear portion of this study will be reported separately.      Physical Exam:  CONSTITUTIONAL: No fever, no chills  HEENT: " Normocephalic, atraumatic,pupils reactive to light                 NECK:  No JVD no carotid bruit  CVS: S1S2+, RRR, no murmurs,   LUNGS: Clear  ABDOMEN: Soft, NT, BS+  EXTREMITIES: No cyanosis, edema  : No judge catheter  NEURO: AAO X 3  PSY: Normal affect      Medication List with Changes/Refills   Current Medications    CHOLECALCIFEROL, VITAMIN D3, (VITAMIN D3) 125 MCG (5,000 UNIT) TAB    Take 5,000 Units by mouth once daily.    CINNAMON BARK (CINNAMON) 500 MG CAPSULE    Take 500 mg by mouth once daily.    CLOBETASOL 0.05% (TEMOVATE) 0.05 % OINT    Apply 1 Application topically Daily.    COENZYME Q10 (COQ-10) 100 MG CAPSULE    Take 100 mg by mouth once daily.    DIPHENOXYLATE-ATROPINE 2.5-0.025 MG (LOMOTIL) 2.5-0.025 MG PER TABLET    Take 1 tablet by mouth 4 (four) times daily.    FLUTICASONE PROPIONATE (FLONASE) 50 MCG/ACTUATION NASAL SPRAY    1 spray by Each Nostril route 2 (two) times daily.    GARLIC 100 MG TAB    Take 1 tablet by mouth Daily.    GUAIFENESIN (MUCINEX) 600 MG 12 HR TABLET    Take 1,200 mg by mouth daily as needed.    LIDOCAINE HCL-HYDROCORTISON AC (LIDAMANTLE-HC) 3-0.5 % TOPICAL CREAM    Apply 1 packet topically 2 (two) times a day.    LORATADINE (CLARITIN) 10 MG TABLET    Take 10 mg by mouth once daily.    MAGNESIUM OXIDE (MAG-OX) 400 MG (241.3 MG MAGNESIUM) TABLET    Take 400 mg by mouth once daily.    MULTIVITAMIN (ONE DAILY MULTIVITAMIN) PER TABLET    Take 1 tablet by mouth once daily.    OMEPRAZOLE (PRILOSEC) 20 MG CAPSULE    Take 20 mg by mouth once daily.    ONDANSETRON (ZOFRAN-ODT) 4 MG TBDL    Take 4 mg by mouth every 6 (six) hours as needed.    POLYETHYLENE GLYCOL (GLYCOLAX) 17 GRAM/DOSE POWDER    Take 17 g by mouth once daily.    PROMETHAZINE (PHENERGAN) 12.5 MG TAB    Take 1 tablet by mouth daily as needed.    TRAZODONE (DESYREL) 50 MG TABLET    Take 50 mg by mouth nightly as needed for Insomnia.    TRIAMCINOLONE ACETONIDE 0.1% (KENALOG) 0.1 % CREAM    Apply 1 g topically 2 (two)  times daily.   Changed and/or Refilled Medications    Modified Medication Previous Medication    ATORVASTATIN (LIPITOR) 20 MG TABLET atorvastatin (LIPITOR) 20 MG tablet       Take 1 tablet (20 mg total) by mouth once daily.    Take 1 tablet (20 mg total) by mouth once daily.    FUROSEMIDE (LASIX) 20 MG TABLET furosemide (LASIX) 20 MG tablet       Take 1 tablet (20 mg total) by mouth daily as needed (prn).    Take 20 mg by mouth daily as needed.    IRBESARTAN (AVAPRO) 300 MG TABLET irbesartan (AVAPRO) 300 MG tablet       Take 1 tablet (300 mg total) by mouth once daily.    Take 1 tablet (300 mg total) by mouth once daily.    LABETALOL (NORMODYNE) 200 MG TABLET labetaloL (NORMODYNE) 200 MG tablet       Take 1 tablet (200 mg total) by mouth 2 (two) times daily.    Take 1 tablet (200 mg total) by mouth 2 (two) times daily.             Assessment:       1. Dyslipidemia    2. Essential hypertension    White coat syndrome  Home BP stable.      Plan:     Problem List Items Addressed This Visit          Cardiac/Vascular    Essential hypertension    Relevant Medications    labetaloL (NORMODYNE) 200 MG tablet    irbesartan (AVAPRO) 300 MG tablet    furosemide (LASIX) 20 MG tablet    Other Relevant Orders    CBC Auto Differential    TSH    Dyslipidemia - Primary    Relevant Medications    atorvastatin (LIPITOR) 20 MG tablet    Other Relevant Orders    CBC Auto Differential    TSH       No follow-ups on file.     Dictation #1  MRN:[id number]  CSN:[id number]   SARA Nino-ACNP, CVNP-BC

## 2024-01-31 ENCOUNTER — OFFICE VISIT (OUTPATIENT)
Dept: FAMILY MEDICINE | Facility: CLINIC | Age: 74
End: 2024-01-31
Payer: MEDICARE

## 2024-01-31 VITALS
HEIGHT: 60 IN | WEIGHT: 188 LBS | HEART RATE: 85 BPM | BODY MASS INDEX: 36.91 KG/M2 | SYSTOLIC BLOOD PRESSURE: 138 MMHG | OXYGEN SATURATION: 98 % | DIASTOLIC BLOOD PRESSURE: 78 MMHG

## 2024-01-31 DIAGNOSIS — F51.01 PRIMARY INSOMNIA: ICD-10-CM

## 2024-01-31 DIAGNOSIS — E78.5 DYSLIPIDEMIA: ICD-10-CM

## 2024-01-31 DIAGNOSIS — I10 ESSENTIAL HYPERTENSION: Primary | ICD-10-CM

## 2024-01-31 DIAGNOSIS — J30.89 SEASONAL ALLERGIC RHINITIS DUE TO OTHER ALLERGIC TRIGGER: ICD-10-CM

## 2024-01-31 DIAGNOSIS — Z11.59 NEED FOR HEPATITIS C SCREENING TEST: ICD-10-CM

## 2024-01-31 DIAGNOSIS — K21.00 GASTROESOPHAGEAL REFLUX DISEASE WITH ESOPHAGITIS WITHOUT HEMORRHAGE: ICD-10-CM

## 2024-01-31 PROCEDURE — 99214 OFFICE O/P EST MOD 30 MIN: CPT | Mod: S$GLB,,, | Performed by: NURSE PRACTITIONER

## 2024-01-31 RX ORDER — OMEPRAZOLE 20 MG/1
20 CAPSULE, DELAYED RELEASE ORAL DAILY
Qty: 90 CAPSULE | Refills: 1 | Status: SHIPPED | OUTPATIENT
Start: 2024-01-31

## 2024-01-31 RX ORDER — FLUTICASONE PROPIONATE 50 MCG
1 SPRAY, SUSPENSION (ML) NASAL 2 TIMES DAILY
Qty: 11.1 ML | Refills: 1 | Status: SHIPPED | OUTPATIENT
Start: 2024-01-31

## 2024-01-31 RX ORDER — TRAZODONE HYDROCHLORIDE 50 MG/1
50 TABLET ORAL NIGHTLY PRN
Qty: 90 TABLET | Refills: 1 | Status: SHIPPED | OUTPATIENT
Start: 2024-01-31

## 2024-01-31 NOTE — PROGRESS NOTES
SUBJECTIVE:      Patient ID: Erlinda Reaves is a 73 y.o. female.    Chief Complaint: Hypertension and Hyperlipidemia    Patient is here today to establish care. She follows with Dr Silva for GYN, utd with pap, mammo and DEXA. Follows with cardiology. She is doing well today.     Hypertension  This is a chronic problem. The current episode started more than 1 year ago. The problem is unchanged. The problem is controlled. Pertinent negatives include no chest pain, headaches, palpitations, peripheral edema or shortness of breath. Risk factors for coronary artery disease include dyslipidemia. Past treatments include beta blockers, angiotensin blockers and diuretics. The current treatment provides moderate improvement. Compliance problems include exercise.    Hyperlipidemia  This is a chronic problem. The current episode started more than 1 year ago. The problem is controlled. Recent lipid tests were reviewed and are normal. Pertinent negatives include no chest pain or shortness of breath. Current antihyperlipidemic treatment includes statins. Risk factors for coronary artery disease include hypertension.   Gastroesophageal Reflux  She complains of heartburn. She reports no abdominal pain, no chest pain or no coughing. This is a chronic problem. The current episode started more than 1 year ago. The problem occurs occasionally. The problem has been unchanged. The heartburn is located in the abdomen. The heartburn is of mild intensity. The heartburn does not wake her from sleep. The heartburn does not limit her activity. The heartburn doesn't change with position.       Past Surgical History:   Procedure Laterality Date     SECTION      COLONOSCOPY      TONSILLECTOMY       Family History   Problem Relation Age of Onset    Hypertension Sister     Hypertension Sister     Breast cancer Neg Hx     Colon cancer Neg Hx       Social History     Socioeconomic History    Marital status:     Number of  children: 1   Occupational History    Occupation: retired   Tobacco Use    Smoking status: Never     Passive exposure: Never    Smokeless tobacco: Never   Substance and Sexual Activity    Alcohol use: Never    Drug use: Never    Sexual activity: Not Currently   Social History Narrative    Live alone     Current Outpatient Medications   Medication Sig Dispense Refill    atorvastatin (LIPITOR) 20 MG tablet Take 1 tablet (20 mg total) by mouth once daily. 90 tablet 3    cholecalciferol, vitamin D3, (VITAMIN D3) 125 mcg (5,000 unit) Tab Take 5,000 Units by mouth once daily.      cinnamon bark (CINNAMON) 500 mg capsule Take 500 mg by mouth once daily.      clobetasol 0.05% (TEMOVATE) 0.05 % Oint Apply 1 Application topically Daily.      coenzyme Q10 (COQ-10) 100 mg capsule Take 100 mg by mouth once daily.      furosemide (LASIX) 20 MG tablet Take 1 tablet (20 mg total) by mouth daily as needed (prn). 90 tablet 1    garlic 100 mg Tab Take 1 tablet by mouth Daily.      guaiFENesin (MUCINEX) 600 mg 12 hr tablet Take 1,200 mg by mouth daily as needed.      irbesartan (AVAPRO) 300 MG tablet Take 1 tablet (300 mg total) by mouth once daily. 90 tablet 3    labetaloL (NORMODYNE) 200 MG tablet Take 1 tablet (200 mg total) by mouth 2 (two) times daily. 180 tablet 3    LIDOcaine HCl-hydrocortison ac (LIDAMANTLE-HC) 3-0.5 % topical cream Apply 1 packet topically 2 (two) times a day.      loratadine (CLARITIN) 10 mg tablet Take 10 mg by mouth once daily.      magnesium oxide (MAG-OX) 400 mg (241.3 mg magnesium) tablet Take 400 mg by mouth once daily.      multivitamin (ONE DAILY MULTIVITAMIN) per tablet Take 1 tablet by mouth once daily.      polyethylene glycol (GLYCOLAX) 17 gram/dose powder Take 17 g by mouth once daily.      promethazine (PHENERGAN) 12.5 MG Tab Take 1 tablet by mouth daily as needed.      fluticasone propionate (FLONASE) 50 mcg/actuation nasal spray 1 spray (50 mcg total) by Each Nostril route 2 (two) times daily.  11.1 mL 1    omeprazole (PRILOSEC) 20 MG capsule Take 1 capsule (20 mg total) by mouth once daily. 90 capsule 1    traZODone (DESYREL) 50 MG tablet Take 1 tablet (50 mg total) by mouth nightly as needed for Insomnia. 90 tablet 1     No current facility-administered medications for this visit.     Review of patient's allergies indicates:   Allergen Reactions    Doxycycline Nausea Only      Past Medical History:   Diagnosis Date    Chronic sinusitis     Hyperlipemia     Hypertension     IBS (irritable bowel syndrome)     Insomnia     Osteoarthritis      Past Surgical History:   Procedure Laterality Date     SECTION      COLONOSCOPY      TONSILLECTOMY         Review of Systems   Constitutional:  Negative for appetite change, chills, diaphoresis and unexpected weight change.   HENT:  Negative for ear discharge, hearing loss, trouble swallowing and voice change.    Eyes:  Negative for photophobia and pain.   Respiratory:  Negative for cough, chest tightness, shortness of breath and stridor.    Cardiovascular:  Negative for chest pain, palpitations and leg swelling.   Gastrointestinal:  Positive for heartburn. Negative for abdominal pain, blood in stool and vomiting.   Endocrine: Negative for cold intolerance and heat intolerance.   Genitourinary:  Negative for difficulty urinating and flank pain.   Musculoskeletal:  Negative for joint swelling and neck stiffness.   Skin:  Negative for pallor.   Neurological:  Negative for dizziness, speech difficulty, weakness, light-headedness and headaches.   Hematological:  Does not bruise/bleed easily.   Psychiatric/Behavioral:  Negative for confusion, dysphoric mood, self-injury, sleep disturbance and suicidal ideas. The patient is not nervous/anxious.       OBJECTIVE:      Vitals:    24 1248 24 1323   BP: (!) (P) 180/74 138/78   Pulse: 85    SpO2: 98%    Weight: 85.3 kg (188 lb)    Height: 5' (1.524 m)      Physical Exam  Vitals and nursing note reviewed.    Constitutional:       General: She is not in acute distress.     Appearance: She is well-developed.   HENT:      Head: Normocephalic and atraumatic.      Right Ear: Tympanic membrane normal.      Left Ear: Tympanic membrane normal.      Nose: Nose normal.      Mouth/Throat:      Pharynx: Uvula midline.   Eyes:      General: Lids are normal.      Conjunctiva/sclera: Conjunctivae normal.      Pupils: Pupils are equal, round, and reactive to light.      Right eye: Pupil is round and reactive.      Left eye: Pupil is round and reactive.   Neck:      Thyroid: No thyromegaly.      Vascular: No JVD.      Trachea: Trachea normal.   Cardiovascular:      Rate and Rhythm: Normal rate and regular rhythm.      Pulses: Normal pulses.      Heart sounds: Normal heart sounds. No murmur heard.  Pulmonary:      Effort: Pulmonary effort is normal. No tachypnea or respiratory distress.      Breath sounds: Normal breath sounds. No wheezing, rhonchi or rales.   Abdominal:      General: Bowel sounds are normal.      Palpations: Abdomen is soft.      Tenderness: There is no abdominal tenderness.   Musculoskeletal:         General: Normal range of motion.      Cervical back: Normal range of motion and neck supple.      Right lower leg: No edema.      Left lower leg: No edema.   Lymphadenopathy:      Cervical: No cervical adenopathy.   Skin:     General: Skin is warm and dry.      Findings: No rash.   Neurological:      Mental Status: She is alert and oriented to person, place, and time.   Psychiatric:         Mood and Affect: Mood normal.         Speech: Speech normal.         Behavior: Behavior normal. Behavior is cooperative.         Thought Content: Thought content normal.         Judgment: Judgment normal.        Last visit note, most recent available labs, and health maintenance reviewed    Assessment:       1. Essential hypertension    2. Dyslipidemia    3. Gastroesophageal reflux disease with esophagitis without hemorrhage    4.  Need for hepatitis C screening test    5. Primary insomnia    6. Seasonal allergic rhinitis due to other allergic trigger        Plan:       Essential hypertension  -     CBC Auto Differential; Future; Expected date: 02/14/2024  -     Comprehensive Metabolic Panel; Future; Expected date: 02/14/2024  -     Lipid Panel; Future; Expected date: 02/14/2024  -     TSH; Future; Expected date: 03/13/2024  -     Urinalysis; Future; Expected date: 02/14/2024  Stable    Dyslipidemia  Stable on current meds    Gastroesophageal reflux disease with esophagitis without hemorrhage  -     omeprazole (PRILOSEC) 20 MG capsule; Take 1 capsule (20 mg total) by mouth once daily.  Dispense: 90 capsule; Refill: 1    Need for hepatitis C screening test  -     Hepatitis C Antibody; Future; Expected date: 02/14/2024    Primary insomnia  -     traZODone (DESYREL) 50 MG tablet; Take 1 tablet (50 mg total) by mouth nightly as needed for Insomnia.  Dispense: 90 tablet; Refill: 1    Seasonal allergic rhinitis due to other allergic trigger  -     fluticasone propionate (FLONASE) 50 mcg/actuation nasal spray; 1 spray (50 mcg total) by Each Nostril route 2 (two) times daily.  Dispense: 11.1 mL; Refill: 1        Follow up in about 6 months (around 7/31/2024) for htn .      1/31/2024 RAMILA Rios, FNP

## 2024-02-07 DIAGNOSIS — Z78.0 MENOPAUSE: ICD-10-CM

## 2024-02-16 ENCOUNTER — NURSE TRIAGE (OUTPATIENT)
Dept: ADMINISTRATIVE | Facility: CLINIC | Age: 74
End: 2024-02-16
Payer: MEDICARE

## 2024-02-16 NOTE — TELEPHONE ENCOUNTER
Reason for Disposition   Nursing judgment    Protocols used: Information Only Call - No Triage-A-OH  Pt states a bone density test was ordered for 2/21. States she has several questions about the test. States she did not get to talk to her doctor about it. Pt states she also wants a mammogram.  Advised pt this message will go to the PCP and office nurse to contact her. Caller verbalized understanding.

## 2024-02-19 DIAGNOSIS — Z12.31 ENCOUNTER FOR SCREENING MAMMOGRAM FOR MALIGNANT NEOPLASM OF BREAST: Primary | ICD-10-CM

## 2024-02-21 ENCOUNTER — HOSPITAL ENCOUNTER (OUTPATIENT)
Dept: RADIOLOGY | Facility: HOSPITAL | Age: 74
Discharge: HOME | End: 2024-02-21
Attending: NURSE PRACTITIONER
Payer: MEDICARE

## 2024-02-21 DIAGNOSIS — Z78.0 MENOPAUSE: ICD-10-CM

## 2024-02-21 PROCEDURE — 77080 DXA BONE DENSITY AXIAL: CPT | Mod: TC,PO

## 2024-02-22 ENCOUNTER — TELEPHONE (OUTPATIENT)
Dept: FAMILY MEDICINE | Facility: CLINIC | Age: 74
End: 2024-02-22
Payer: MEDICARE

## 2024-02-22 NOTE — TELEPHONE ENCOUNTER
----- Message from Quinten Weeks NP sent at 2/22/2024 11:46 AM CST -----  Bone density scan shows osteopenia, which means your bones are less dense than normal but not quite osteoporosis. I recommend taking at least calcium 1200mg and Vitamin D of 1000 IU daily. In addition, I recommend light weight exercises to prevent progression to osteoporosis. We can repeat the bone density scan in 2-4 yrs.

## 2024-02-22 NOTE — TELEPHONE ENCOUNTER
----- Message from Stephanie Valenzuela sent at 2/22/2024  9:46 AM CST -----  Please keith with her Bone Density results. Pt's # [phone]  [facility]

## 2024-02-27 ENCOUNTER — HOSPITAL ENCOUNTER (OUTPATIENT)
Dept: RADIOLOGY | Facility: HOSPITAL | Age: 74
Discharge: HOME | End: 2024-02-27
Attending: FAMILY MEDICINE
Payer: MEDICARE

## 2024-02-27 DIAGNOSIS — Z12.31 ENCOUNTER FOR SCREENING MAMMOGRAM FOR MALIGNANT NEOPLASM OF BREAST: ICD-10-CM

## 2024-02-27 PROCEDURE — 77067 SCR MAMMO BI INCL CAD: CPT | Mod: TC,PO

## 2024-02-28 NOTE — PROGRESS NOTES
Call patient.  Mammogram was normal.  Repeat mammogram in 1 year. Patient needs a refill on montelukast (SINGULAIR) 10 MG tablet 30 day supply. Would like it sent to the [facility]'s on [address] in [place]. If no answer please leave a detailed message for the patient.

## 2024-02-29 ENCOUNTER — TELEPHONE (OUTPATIENT)
Dept: FAMILY MEDICINE | Facility: CLINIC | Age: 74
End: 2024-02-29
Payer: MEDICARE

## 2024-02-29 NOTE — TELEPHONE ENCOUNTER
----- Message from Quinten Weeks NP sent at 2/29/2024  3:02 PM CST -----  Regarding: RE: OTC med advice  No but I can refer her to neurology for an eval  ----- Message -----  From: Alissa Haywood LPN  Sent: 2/29/2024   3:02 PM CST  To: Quinten Weeks NP  Subject: OTC med advice                                     ----- Message -----  From: Stephanie Vaelnzuela  Sent: 2/29/2024   3:01 PM CST  To: [facility] Staff    The patients wants to know is there anything she can take OTC for her memory pt's # [phone]

## 2024-03-14 ENCOUNTER — PATIENT OUTREACH (OUTPATIENT)
Dept: ADMINISTRATIVE | Facility: HOSPITAL | Age: 74
End: 2024-03-14
Payer: MEDICARE

## 2024-03-14 NOTE — LETTER
AUTHORIZATION FOR RELEASE OF   CONFIDENTIAL INFORMATION    Dear Dr. William Santana,    We are seeing Erlinda Reaves, date of birth 1950, in the clinic at [facility]. Quinten Weeks NP is the patient's PCP. Erlinda Reaves has an outstanding lab/procedure at the time we reviewed her chart. In order to help keep her health information updated, she has authorized us to request the following medical record(s):        ( x )  COLONOSCOPY        Please fax records to [facility], Cox, Casey H., NP, [phone]     If you have any questions, please contact       Patt Pringle  Nurse Clinical Care Coordinator  [facility]  Phone: [phone]  Fax: [phone]      Patient Name: Erlinda Reaves  : 1950  Patient Phone #: [phone]                Erlinda Reaves  MRN: [id number]  : 1950  Age: 72 y.o.  Sex: female         Patient/Legal Guardian Signature  This signature was collected at 2023    Erlinda Reaves     Self  _______________________________   Printed Name/Relationship to Patient      Consent for Examination and Treatment: I hereby authorize the providers and employees of [facility] ([facility]) to provide medical treatment/services which includes, but is not limited to, performing and administering tests and diagnostic procedures that are deemed necessary, including, but not limited to, imaging examinations, blood tests and other laboratory procedures as may be required by the hospital, clinic, or may be ordered by my physician(s) or persons working under the general and/or special instructions of my physician(s).      I understand and agree that this consent covers all authorized persons, including but not limited to physicians, residents, nurse practitioners, physicians' assistants, specialists, consultants, student nurses, and independently contracted physicians, who are called upon by the physician in  charge, to carry out the diagnostic procedures and medical or surgical treatment.     I hereby authorize [facility] to retain or dispose of any specimens or tissue, should there be such remaining from any test or procedure.     I hereby authorize and give consent for [facility] providers and employees to take photographs, images or videotapes of such diagnostic, surgical or treatment procedures of Patient as may be required by [facility] or as may be ordered by a physician. I further acknowledge and agree that [facility] may use cameras or other devices for patient monitoring.     I am aware that the practice of medicine is not an exact science, and I acknowledge that no guarantees have been made to me as to the outcome of any tests, procedures or treatment.     Authorization for Release of Information: I understand that my insurance company and/or their agents may need information necessary to make determinations about payment/reimbursement. I hereby provide authorization to release to all insurance companies, their successors, assignees, other parties with whom they may have contracted, or others acting on their behalf, that are involved with payment for any hospital and/or clinic charges incurred by the patient, any information that they request and deem necessary for payment/reimbursement, and/or quality review.  I further authorize the release of my health information to physicians or other health care practitioners on staff who are involved in my health care now and in the future, and to other health care providers, entities, or institutions for the purpose of my continued care and treatment, including referrals.     REGISTRATION AUTHORIZATION  Form No. 04872 (Rev. 7/13/2022)       Medicare Patient's Certification and Authorization to Release Information and Payment Request:  I certify that the information given by me in applying for payment under Title XVIII of the Social Security Act is correct. I authorize any sheridan  of medical or other information about me to release to the [facility], or its intermediaries or carriers, any information needed for this or a related Medicare claim. I request that payment of authorized benefits be made on my behalf.     Assignment of Insurance Benefits:   I hereby authorize any and all insurance companies, health plans, defined   benefit plans, health insurers or any entity that is or may be responsible for payment of my medical expenses to pay all hospital and medical benefits now due, and to become due and payable to me under any hospital benefits, sick benefits, injury benefits or any other benefit for services rendered to me, including Major Medical Benefits, direct to [facility] and all independently contracted physicians. I assign any and all rights that I may have against any and all insurance companies, health plans, defined benefit plans, health insurers or any entity that is or may be responsible for payment of my medical expenses, including, but not limited to any right to appeal a denial of a claim, any right to bring any action, lawsuit, administrative proceeding, or other cause of action on my behalf. I specifically assign my right to pursue litigation against any and all insurance companies, health plans, defined benefit plans, health insurers or any entity that is or may be responsible for payment of my medical expenses based upon a refusal to pay charges.            E. Valuables: It is understood and agreed that [facility] is not liable for the damage to or loss of any money, jewelry,   documents, dentures, eye glasses, hearing aids, prosthetics, or other property of value.     F. Computer Equipment: I understand and agree that should I choose to use computer equipment owned by [facility] or if I choose to access the Internet via [facility]s network, I do so at my own risk. [facility] is not responsible for any damage to my computer equipment or to any damages of any type  that might arise from my loss of equipment or data.     G. Acceptance of Financial Responsibility:  I agree that in consideration of the services and   supplies that have been   or will be furnished to the patient, I am hereby obligated to pay all charges made for or on the account of the patient according to the standard rates (in effect at the time the services and supplies are delivered) established by [facility], including its Patient Financial Assistance Policy to the extent it is applicable. I understand that I am responsible for all charges, or portions thereof, not covered by insurance or other sources. Patient refunds will be distributed only after balances at all [facility] facilities are paid.     H. Communication Authorization:  I hereby authorize [facility] and its representatives, along with any billing service   or  who may work on their behalf, to contact me on   my cell phone and/or home phone using pre- recorded messages, artificial voice messages, automatic telephone dialing devices or other computer assisted technology, or by electronic      mail, text messaging, or by any other form of electronic communication. This includes, but is not limited to, appointment reminders, yearly physical exam reminders, preventive care reminders, patient campaigns, welcome calls, and calls about account balances on my account or any account on which I am listed as a guarantor. I understand I have the right to opt out of these communications at any time.      Relationship  Between  Facility and  Provider:      I understand that some, but not all, providers furnishing services to the patient are not employees or agents of [facility]. The patient is under the care and supervision of his/her attending physician, and it is the responsibility of the facility and its nursing staff to carry out the instructions of such physicians. It is the responsibility of the patient's physician/designee to obtain the patient's  informed consent, when required, for medical or surgical treatment, special diagnostic or therapeutic procedures, or hospital services rendered for the patient under the special instructions of the physician/designee.     REGISTRATION AUTHORIZATION  Form No. 67548 (Rev. 7/13/2022)      Notice of Privacy Practices: I acknowledge I have received a copy of [facility]'s Notice of Privacy Practices.     Facility  Directory: I have discussed with the organization my desire to be either included or excluded  in the facility directory in the event of my being an inpatient at an [facility] facility. I understand that if my choice is to opt-out of being identified in the facility directory that the facility will not provide any information about me such as my condition (e.g. fair, stable, etc.) or my location in the facility (e.g., room number, department).     Immunizations: [facility] shares immunization information with state sponsored health departments to help you and your doctor keep track of your immunization records. By signing, you consent to have this information shared with the health department in your state:                                [place] - LINKS ([place] Immunization Network for Kids Statewide)                                [place] - MIIX ([place] Immunization Information eXchange)                                [place] - ImmPRINT (Immunization Patient Registry with Integrated Technology)     TERM: This authorization is valid for this and subsequent care/treatment I receive at [facility] and will remain valid unless/until revoked in writing by me.     [facility]: As used in this document, [facility] means all [facility] owned and managed facilities, including, but not limited to, all health centers, surgery centers, clinics, urgent care centers, and hospitals.         [facility] complies with applicable Federal civil rights laws and does not discriminate on the basis of race,  color, national origin, age, disability, or sex.  ATENCIÓN: si habla español, tiene a stephens disposición servicios gratuitos de asistencia lingüística. [place] al [phone].  CHÚ Ý: N?u b?n nói Ti?ng Vi?t, có các d?ch v? h? tr? ngôn ng? mi?n phí dành cho b?n. G?i s? [phone].        REGISTRATION AUTHORIZATION  Form No. 99794 (Rev. 7/13/2022)

## 2024-03-14 NOTE — PROGRESS NOTES
Population Health Chart Review & Patient Outreach Details      Additional [facility] Notes:               Updates Requested / Reviewed:      Updated Care Coordination Note, Care Everywhere, , and Immunizations Reconciliation Completed or Queried: [facility] Topics Overdue:      [facility] Score: 1     Colon Cancer Screening    RSV Vaccine                  Health Maintenance Topic(s) Outreach Outcomes & Actions Taken:    Colorectal Cancer Screening - Outreach Outcomes & Actions Taken  : External Records Requested & Care Team Updated if Applicable

## 2024-03-21 ENCOUNTER — PATIENT OUTREACH (OUTPATIENT)
Dept: ADMINISTRATIVE | Facility: HOSPITAL | Age: 74
End: 2024-03-21
Payer: MEDICARE

## 2024-03-21 NOTE — PROGRESS NOTES
Population Health Chart Review & Patient Outreach Details      Additional [facility] Notes:               Updates Requested / Reviewed:      Updated Care Coordination Note, , and Care Team Updated         Health Maintenance Topics Overdue:      [facility] Score: 1     Colon Cancer Screening    RSV Vaccine                  Health Maintenance Topic(s) Outreach Outcomes & Actions Taken:    Colorectal Cancer Screening - Outreach Outcomes & Actions Taken  : External Records Uploaded, Care Team Updated, & History Updated if Applicable

## 2024-03-29 NOTE — TELEPHONE ENCOUNTER
Returned call. Confirmed that we are in network with her insurance. Pt declined to make an appointment at this time but will call back when ready. Pt thanked me for returning her call.     ----- Message from Antonieta Waite MA sent at 1/18/2022 10:13 AM CST -----  Type:  Needs Medical Advice    Who Called: pt  Regarding: pt would like to know if ENT doctor accepts Medicare / SIGNA secondary Insurance. Requesting call back  Would the patient rather a call back or a response via [facility]? Call back  Best Call Back Number: [phone]  Additional Information: n/a        
29-Mar-2024 13:30

## 2024-06-11 NOTE — ASSESSMENT & PLAN NOTE
Continue on Prilosec 20 mg daily and supplement magnesium 400 mg a day  
Her blood pressure is somewhat labile today encouraged her to continue on labetalol 200 mg twice daily her heart rate is 63 beats per minute.  Stop the Lasix.  Avapro 300 mg daily.  Maintain on low-salt diet and continue same regimen.  I will add Maxzide 37.5/25 mg every other day.  This could be increased to daily as needed  
No acute changes noted.  Encouraged her to continue on risk factor modification with Lipitor 20 mg daily her last LDL cholesterol is 91 maintain on low-fat low-cholesterol diet  
Include Z78.9 (Other Specified Conditions Influencing Health Status) As An Associated Diagnosis?: Yes
Kenalog Type Of Vial: Multiple Dose
How Many Mls Were Removed From The 40 Mg/Ml (1ml) Vial When Preparing The Injectable Solution?: 0
Medical Necessity Clause: This procedure was medically necessary because the lesions that were treated were:
Expiration Date For Kenalog (Optional): 12/2025
Consent: The risks of atrophy were reviewed with the patient.
Size Of Lesion (Optional): 0.3
Show Inventory Tab: Hide
Concentration Of Kenalog Solution Injected (Mg/Ml): 10.0
Ndc# For Kenalog Only: 9561-5610-40
Bill For Wasted Drug (Kenalog)?: no
Total Volume (Ccs): 0.2
Treatment Number (Optional): 1
Kenalog Preparation: Kenalog
Detail Level: Zone
Lot # For Kenalog (Optional): [id number]

## 2024-06-17 ENCOUNTER — TELEPHONE (OUTPATIENT)
Dept: FAMILY MEDICINE | Facility: CLINIC | Age: 74
End: 2024-06-17
Payer: MEDICARE

## 2024-06-17 NOTE — TELEPHONE ENCOUNTER
Spoke with pt advised labs have not been resulted yet. Can take 24-48 hrs. Pt voiced understanding. States she will call Wednesday or Thursday.

## 2024-06-17 NOTE — TELEPHONE ENCOUNTER
----- Message from Gina Thomas sent at 6/17/2024  8:56 AM CDT -----  Pt is returning the office call.  She can P/U the results any time so leave them up front. Pt #[phone]

## 2024-06-17 NOTE — TELEPHONE ENCOUNTER
----- Message from Julien Abdalla sent at 6/17/2024  8:10 AM CDT -----  Pt was seen today and did labs and would like a copy of her results.  [phone]

## 2024-06-18 LAB
ALBUMIN SERPL-MCNC: 4.4 G/DL (ref 3.6–5.1)
ALBUMIN/GLOB SERPL: 2 (CALC) (ref 1–2.5)
ALP SERPL-CCNC: 89 U/L (ref 37–153)
ALT SERPL-CCNC: 19 U/L (ref 6–29)
APPEARANCE UR: CLEAR
AST SERPL-CCNC: 25 U/L (ref 10–35)
BASOPHILS # BLD AUTO: 39 CELLS/UL (ref 0–200)
BASOPHILS NFR BLD AUTO: 0.9 %
BILIRUB SERPL-MCNC: 0.5 MG/DL (ref 0.2–1.2)
BILIRUB UR QL STRIP: NEGATIVE
BUN SERPL-MCNC: 19 MG/DL (ref 7–25)
BUN/CREAT SERPL: NORMAL (CALC) (ref 6–22)
CALCIUM SERPL-MCNC: 10 MG/DL (ref 8.6–10.4)
CHLORIDE SERPL-SCNC: 103 MMOL/L (ref 98–110)
CHOLEST SERPL-MCNC: 173 MG/DL
CHOLEST/HDLC SERPL: 2.8 (CALC)
CO2 SERPL-SCNC: 31 MMOL/L (ref 20–32)
COLOR UR: YELLOW
CREAT SERPL-MCNC: 0.79 MG/DL (ref 0.6–1)
EGFR: 79 ML/MIN/1.73M2
EOSINOPHIL # BLD AUTO: 142 CELLS/UL (ref 15–500)
EOSINOPHIL NFR BLD AUTO: 3.3 %
ERYTHROCYTE [DISTWIDTH] IN BLOOD BY AUTOMATED COUNT: 13.8 % (ref 11–15)
GLOBULIN SER CALC-MCNC: 2.2 G/DL (CALC) (ref 1.9–3.7)
GLUCOSE SERPL-MCNC: 93 MG/DL (ref 65–99)
GLUCOSE UR QL STRIP: NEGATIVE
HCT VFR BLD AUTO: 39.5 % (ref 35–45)
HCV AB SERPL QL IA: NORMAL
HDLC SERPL-MCNC: 62 MG/DL
HGB BLD-MCNC: 12.3 G/DL (ref 11.7–15.5)
HGB UR QL STRIP: NEGATIVE
KETONES UR QL STRIP: NEGATIVE
LDLC SERPL CALC-MCNC: 85 MG/DL (CALC)
LEUKOCYTE ESTERASE UR QL STRIP: NEGATIVE
LYMPHOCYTES # BLD AUTO: 1174 CELLS/UL (ref 850–3900)
LYMPHOCYTES NFR BLD AUTO: 27.3 %
MCH RBC QN AUTO: 30.1 PG (ref 27–33)
MCHC RBC AUTO-ENTMCNC: 31.1 G/DL (ref 32–36)
MCV RBC AUTO: 96.6 FL (ref 80–100)
MONOCYTES # BLD AUTO: 292 CELLS/UL (ref 200–950)
MONOCYTES NFR BLD AUTO: 6.8 %
NEUTROPHILS # BLD AUTO: 2653 CELLS/UL (ref 1500–7800)
NEUTROPHILS NFR BLD AUTO: 61.7 %
NITRITE UR QL STRIP: NEGATIVE
NONHDLC SERPL-MCNC: 111 MG/DL (CALC)
PH UR STRIP: 6.5 [PH] (ref 5–8)
PLATELET # BLD AUTO: 184 THOUSAND/UL (ref 140–400)
PMV BLD REES-ECKER: 9.9 FL (ref 7.5–12.5)
POTASSIUM SERPL-SCNC: 4.2 MMOL/L (ref 3.5–5.3)
PROT SERPL-MCNC: 6.6 G/DL (ref 6.1–8.1)
PROT UR QL STRIP: NEGATIVE
RBC # BLD AUTO: 4.09 MILLION/UL (ref 3.8–5.1)
SODIUM SERPL-SCNC: 141 MMOL/L (ref 135–146)
SP GR UR STRIP: 1.02 (ref 1–1.03)
T4 FREE SERPL-MCNC: 1.2 NG/DL (ref 0.8–1.8)
TRIGL SERPL-MCNC: 162 MG/DL
TSH SERPL-ACNC: 5.7 MIU/L (ref 0.4–4.5)
WBC # BLD AUTO: 4.3 THOUSAND/UL (ref 3.8–10.8)

## 2024-06-25 ENCOUNTER — TELEPHONE (OUTPATIENT)
Dept: FAMILY MEDICINE | Facility: CLINIC | Age: 74
End: 2024-06-25
Payer: MEDICARE

## 2024-06-25 NOTE — TELEPHONE ENCOUNTER
----- Message from Jo Curry sent at 6/25/2024  1:49 PM CDT -----  Regarding: labs  Pt would like to discuss her recent labs please call her with them thanks [phone] [phone]

## 2024-08-05 ENCOUNTER — TELEPHONE (OUTPATIENT)
Dept: FAMILY MEDICINE | Facility: CLINIC | Age: 74
End: 2024-08-05

## 2024-08-05 ENCOUNTER — HOSPITAL ENCOUNTER (OUTPATIENT)
Dept: RADIOLOGY | Facility: HOSPITAL | Age: 74
Discharge: HOME | End: 2024-08-05
Attending: NURSE PRACTITIONER
Payer: MEDICARE

## 2024-08-05 ENCOUNTER — OFFICE VISIT (OUTPATIENT)
Dept: FAMILY MEDICINE | Facility: CLINIC | Age: 74
End: 2024-08-05
Payer: MEDICARE

## 2024-08-05 VITALS
OXYGEN SATURATION: 99 % | HEIGHT: 60 IN | DIASTOLIC BLOOD PRESSURE: 74 MMHG | SYSTOLIC BLOOD PRESSURE: 130 MMHG | WEIGHT: 183 LBS | HEART RATE: 61 BPM | BODY MASS INDEX: 35.93 KG/M2

## 2024-08-05 DIAGNOSIS — E78.5 DYSLIPIDEMIA: ICD-10-CM

## 2024-08-05 DIAGNOSIS — F51.01 PRIMARY INSOMNIA: ICD-10-CM

## 2024-08-05 DIAGNOSIS — R05.9 COUGH, UNSPECIFIED TYPE: ICD-10-CM

## 2024-08-05 DIAGNOSIS — R05.9 COUGH, UNSPECIFIED TYPE: Primary | ICD-10-CM

## 2024-08-05 DIAGNOSIS — K21.00 GASTROESOPHAGEAL REFLUX DISEASE WITH ESOPHAGITIS WITHOUT HEMORRHAGE: ICD-10-CM

## 2024-08-05 DIAGNOSIS — I10 ESSENTIAL HYPERTENSION: Primary | ICD-10-CM

## 2024-08-05 PROCEDURE — 99214 OFFICE O/P EST MOD 30 MIN: CPT | Mod: ,,, | Performed by: NURSE PRACTITIONER

## 2024-08-05 PROCEDURE — 71046 X-RAY EXAM CHEST 2 VIEWS: CPT | Mod: 26,,, | Performed by: RADIOLOGY

## 2024-08-05 PROCEDURE — 71046 X-RAY EXAM CHEST 2 VIEWS: CPT | Mod: TC,PO

## 2024-08-05 RX ORDER — BENZONATATE 100 MG/1
100 CAPSULE ORAL 3 TIMES DAILY PRN
Qty: 30 CAPSULE | Refills: 0 | Status: SHIPPED | OUTPATIENT
Start: 2024-08-05 | End: 2024-08-15

## 2024-08-05 RX ORDER — CYCLOSPORINE 0.5 MG/ML
1 EMULSION OPHTHALMIC 2 TIMES DAILY
COMMUNITY
Start: 2024-07-18

## 2024-08-05 RX ORDER — TRAZODONE HYDROCHLORIDE 50 MG/1
50 TABLET ORAL NIGHTLY PRN
Qty: 90 TABLET | Refills: 1 | Status: SHIPPED | OUTPATIENT
Start: 2024-08-05

## 2024-08-05 RX ORDER — OMEPRAZOLE 20 MG/1
20 CAPSULE, DELAYED RELEASE ORAL DAILY
Qty: 90 CAPSULE | Refills: 1 | Status: SHIPPED | OUTPATIENT
Start: 2024-08-05

## 2024-08-29 ENCOUNTER — TELEPHONE (OUTPATIENT)
Dept: CARDIOLOGY | Facility: CLINIC | Age: 74
End: 2024-08-29
Payer: MEDICARE

## 2024-08-29 NOTE — TELEPHONE ENCOUNTER
----- Message from Corinne Hernandez sent at 8/29/2024  1:46 PM CDT -----  Contact: PT  Type:  Sooner Appointment Request    Caller is requesting a sooner appointment.  Caller declined first available appointment listed below.  Caller will not accept being placed on the waitlist and is requesting a message be sent to doctor.    Name of Caller:  PT  When is the first available appointment?  No solutions  Symptoms: Annual  Would the patient rather a call back or a response via [facility]? Call back  Best Call Back Number:  [phone]  Additional Information:   LOV  1/17/23

## 2024-09-20 ENCOUNTER — OFFICE VISIT (OUTPATIENT)
Dept: CARDIOLOGY | Facility: CLINIC | Age: 74
End: 2024-09-20
Payer: MEDICARE

## 2024-09-20 VITALS
OXYGEN SATURATION: 100 % | DIASTOLIC BLOOD PRESSURE: 82 MMHG | HEART RATE: 64 BPM | HEIGHT: 60 IN | RESPIRATION RATE: 16 BRPM | BODY MASS INDEX: 35.74 KG/M2 | SYSTOLIC BLOOD PRESSURE: 136 MMHG

## 2024-09-20 DIAGNOSIS — R07.9 CHEST PAIN, UNSPECIFIED TYPE: Primary | ICD-10-CM

## 2024-09-20 DIAGNOSIS — I10 ESSENTIAL HYPERTENSION: ICD-10-CM

## 2024-09-20 DIAGNOSIS — J30.89 SEASONAL ALLERGIC RHINITIS DUE TO OTHER ALLERGIC TRIGGER: ICD-10-CM

## 2024-09-20 DIAGNOSIS — E66.01 SEVERE OBESITY (BMI 35.0-39.9) WITH COMORBIDITY: ICD-10-CM

## 2024-09-20 DIAGNOSIS — K21.00 GASTROESOPHAGEAL REFLUX DISEASE WITH ESOPHAGITIS WITHOUT HEMORRHAGE: ICD-10-CM

## 2024-09-20 DIAGNOSIS — E78.5 DYSLIPIDEMIA: ICD-10-CM

## 2024-09-20 PROCEDURE — 99214 OFFICE O/P EST MOD 30 MIN: CPT | Mod: S$PBB,,,

## 2024-09-20 PROCEDURE — 99214 OFFICE O/P EST MOD 30 MIN: CPT | Mod: PBBFAC,PN

## 2024-09-20 PROCEDURE — 99999 PR PBB SHADOW E&M-EST. PATIENT-LVL IV: CPT | Mod: PBBFAC,,,

## 2024-09-20 RX ORDER — ATORVASTATIN CALCIUM 20 MG/1
20 TABLET, FILM COATED ORAL DAILY
Qty: 90 TABLET | Refills: 3 | Status: SHIPPED | OUTPATIENT
Start: 2024-09-20 | End: 2025-09-20

## 2024-09-20 RX ORDER — LABETALOL 100 MG/1
100 TABLET, FILM COATED ORAL 2 TIMES DAILY
Qty: 180 TABLET | Refills: 1 | Status: SHIPPED | OUTPATIENT
Start: 2024-09-20

## 2024-09-20 RX ORDER — OMEGA-3 FATTY ACIDS 1000 MG
1 CAPSULE ORAL DAILY
Qty: 90 CAPSULE | Refills: 3 | Status: SHIPPED | OUTPATIENT
Start: 2024-09-20 | End: 2025-09-20

## 2024-09-20 RX ORDER — IRBESARTAN 300 MG/1
300 TABLET ORAL DAILY
Qty: 90 TABLET | Refills: 3 | Status: SHIPPED | OUTPATIENT
Start: 2024-09-20

## 2024-09-20 RX ORDER — OMEPRAZOLE 20 MG/1
20 CAPSULE, DELAYED RELEASE ORAL DAILY
Qty: 90 CAPSULE | Refills: 1 | Status: SHIPPED | OUTPATIENT
Start: 2024-09-20

## 2024-09-20 RX ORDER — FLUTICASONE PROPIONATE 50 MCG
1 SPRAY, SUSPENSION (ML) NASAL 2 TIMES DAILY
Qty: 11.1 ML | Refills: 1 | Status: SHIPPED | OUTPATIENT
Start: 2024-09-20

## 2024-09-20 NOTE — PROGRESS NOTES
"Subjective:    Patient ID:  Erlinda Reaves is a 73 y.o. female who presents for follow-up.  Chief Complaint   Patient presents with    Follow-up     She does get fatigued. She does monitor her bp, she has noticed her pressure at home will run on the lower side. She does have a cough, her pcp ordered a cxr--in epic       HPI:  Erlinda Reaves is here for follow-up visit. She complains of some Chest pain "up top" and SOB with exertion. Denies recent fever cough chills or congestion. Denies blood in the urine or blood in the stool. Denies myalgias. Denies orthopnea or peripheral edema. Denies nausea vomiting or dyspepsia. No recent arm neck or jaw pain. No lightheadedness or dizziness.   She is complaining of some frequent fatigue as well as lingering cough after having rhinitis.  Recent chest x-ray with no evidence of acute cardiopulmonary disease.      Review of patient's allergies indicates:   Allergen Reactions    Doxycycline Nausea Only       Past Medical History:   Diagnosis Date    Chronic sinusitis     Diverticulosis of large intestine without perforation or abscess without bleeding 2023    Hyperlipemia     Hypertension     IBS (irritable bowel syndrome)     Insomnia     Osteoarthritis     Personal history of colonic polyps 2023     Past Surgical History:   Procedure Laterality Date     SECTION      COLONOSCOPY      COLONOSCOPY  2023    3 RECALL    TONSILLECTOMY       Social History     Tobacco Use    Smoking status: Never     Passive exposure: Never    Smokeless tobacco: Never   Substance Use Topics    Alcohol use: Never    Drug use: Never     Family History   Problem Relation Name Age of Onset    Hypertension Sister      Hypertension Sister      Breast cancer Neg Hx      Colon cancer Neg Hx          Review of Systems:   Constitution: Negative for diaphoresis and fever. + generalized weakness   HEENT: Negative for nosebleeds.    Cardiovascular: + for chest pain      + dyspnea on " exertion       No leg swelling        No palpitations  Respiratory: + for shortness of breath and wheezing.    Hematologic/Lymphatic: Negative for bleeding problem. Does not bruise/bleed easily.   Skin: Negative for color change and rash.   Musculoskeletal: Negative for falls and myalgias.   Gastrointestinal: Negative for hematemesis and hematochezia.   Genitourinary: Negative for hematuria.   Neurological: Negative for dizziness and light-headedness.   Psychiatric/Behavioral: Negative for altered mental status and memory loss.          Objective:        Vitals:    09/20/24 1309   BP: 136/82   Pulse: 64   Resp: 16       Lab Results   Component Value Date    WBC 4.3 06/17/2024    HGB 12.3 06/17/2024    HCT 39.5 06/17/2024     06/17/2024    CHOL 173 06/17/2024    TRIG 162 (H) 06/17/2024    HDL 62 06/17/2024    ALT 19 06/17/2024    AST 25 06/17/2024     06/17/2024    K 4.2 06/17/2024     06/17/2024    CREATININE 0.79 06/17/2024    BUN 19 06/17/2024    CO2 31 06/17/2024    TSH 5.70 (H) 06/17/2024        ECHOCARDIOGRAM RESULTS  Results for orders placed in visit on 11/07/19    Echo Color Flow Doppler? Yes    Interpretation Summary  · Normal left ventricular systolic function. The estimated ejection fraction is 60%  · Normal LV diastolic function.  · Mild concentric left ventricular hypertrophy.  · No wall motion abnormalities.  · Mild mitral regurgitation.  · Mild tricuspid regurgitation.  · Normal right ventricular systolic function.  · Mild left atrial enlargement.  · The estimated PA systolic pressure is 31 mm Hg        CURRENT/PREVIOUS VISIT EKG  Results for orders placed or performed in visit on 07/26/23   IN OFFICE EKG 12-LEAD (to [place])    Collection Time: 07/26/23  1:36 PM    Narrative    Test Reason : Z00.00,    Vent. Rate : 059 BPM     Atrial Rate : 059 BPM     P-R Int : 188 ms          QRS Dur : 132 ms      QT Int : 426 ms       P-R-T Axes : 035 -47 080 degrees     QTc Int : 421 ms    Sinus  bradycardia  Left bundle branch block  Abnormal ECG  No previous ECGs available  Confirmed by Osmar LI, Tre Holder ([id number]) on 7/26/2023 11:14:53 PM    Referred By:  AM           Confirmed By:Tre Prasad MD     No valid procedures specified.   Results for orders placed in visit on 11/13/19    Treadmill Stress Test    Interpretation Summary    The EKG portion of this study is negative for ischemia.    The patient reported no chest pain during the stress test.    Arrhythmias during stress: rare PVCs.    The blood pressure response to stress was normal.    ECG Stress Nuclear portion of this study will be reported separately.      Physical Exam:  CONSTITUTIONAL: No fever, no chills  HEENT: Normocephalic, atraumatic,pupils reactive to light                 NECK:  No JVD no carotid bruit  CVS: S1S2+, RRR, no murmurs,   LUNGS: Clear  ABDOMEN: Soft, NT, BS+  EXTREMITIES: No cyanosis, edema  : No judge catheter  NEURO: AAO X 3  PSY: Normal affect      Medication List with Changes/Refills   New Medications    OMEGA-3 FATTY ACIDS 1,000 MG CAP    Take 1 capsule (1,000 mg total) by mouth once daily.   Current Medications    CHOLECALCIFEROL, VITAMIN D3, (VITAMIN D3) 125 MCG (5,000 UNIT) TAB    Take 5,000 Units by mouth once daily.    CINNAMON BARK (CINNAMON) 500 MG CAPSULE    Take 500 mg by mouth once daily.    CLOBETASOL 0.05% (TEMOVATE) 0.05 % OINT    Apply 1 Application topically Daily.    COENZYME Q10 (COQ-10) 100 MG CAPSULE    Take 100 mg by mouth once daily.    FUROSEMIDE (LASIX) 20 MG TABLET    Take 1 tablet (20 mg total) by mouth daily as needed (prn).    GARLIC 100 MG TAB    Take 1 tablet by mouth Daily.    GUAIFENESIN (MUCINEX) 600 MG 12 HR TABLET    Take 1,200 mg by mouth daily as needed.    LIDOCAINE HCL-HYDROCORTISON AC (LIDAMANTLE-HC) 3-0.5 % TOPICAL CREAM    Apply 1 packet topically 2 (two) times a day.    LORATADINE (CLARITIN) 10 MG TABLET    Take 10 mg by mouth once daily.    MAGNESIUM OXIDE (MAG-OX)  400 MG (241.3 MG MAGNESIUM) TABLET    Take 400 mg by mouth once daily.    MULTIVITAMIN (ONE DAILY MULTIVITAMIN) PER TABLET    Take 1 tablet by mouth once daily.    POLYETHYLENE GLYCOL (GLYCOLAX) 17 GRAM/DOSE POWDER    Take 17 g by mouth once daily.    PROMETHAZINE (PHENERGAN) 12.5 MG TAB    Take 1 tablet by mouth daily as needed.    RESTASIS 0.05 % OPHTHALMIC EMULSION    Place 1 drop into both eyes 2 (two) times daily.    TRAZODONE (DESYREL) 50 MG TABLET    Take 1 tablet (50 mg total) by mouth nightly as needed for Insomnia.   Changed and/or Refilled Medications    Modified Medication Previous Medication    ATORVASTATIN (LIPITOR) 20 MG TABLET atorvastatin (LIPITOR) 20 MG tablet       Take 1 tablet (20 mg total) by mouth once daily.    Take 1 tablet (20 mg total) by mouth once daily.    FLUTICASONE PROPIONATE (FLONASE) 50 MCG/ACTUATION NASAL SPRAY fluticasone propionate (FLONASE) 50 mcg/actuation nasal spray       1 spray (50 mcg total) by Each Nostril route 2 (two) times daily.    1 spray (50 mcg total) by Each Nostril route 2 (two) times daily.    IRBESARTAN (AVAPRO) 300 MG TABLET irbesartan (AVAPRO) 300 MG tablet       Take 1 tablet (300 mg total) by mouth once daily.    Take 1 tablet (300 mg total) by mouth once daily.    LABETALOL (NORMODYNE) 100 MG TABLET labetaloL (NORMODYNE) 200 MG tablet       Take 1 tablet (100 mg total) by mouth 2 (two) times daily.    Take 1 tablet (200 mg total) by mouth 2 (two) times daily.    OMEPRAZOLE (PRILOSEC) 20 MG CAPSULE omeprazole (PRILOSEC) 20 MG capsule       Take 1 capsule (20 mg total) by mouth once daily.    Take 1 capsule (20 mg total) by mouth once daily.             Assessment:       1. Chest pain, unspecified type    2. Severe obesity (BMI 35.0-39.9) with comorbidity    3. Essential hypertension    4. Dyslipidemia    5. Seasonal allergic rhinitis due to other allergic trigger    6. Gastroesophageal reflux disease with esophagitis without hemorrhage         Plan:      Problem List Items Addressed This Visit          Cardiac/Vascular    Essential hypertension    Current Assessment & Plan     /82. States her BP has been running low 100s-120s/80s at home. This is causing her to feel badly. Will try reducing labetolol to 100 mg BID and continue Irbesartan          Relevant Medications    labetaloL (NORMODYNE) 100 MG tablet    irbesartan (AVAPRO) 300 MG tablet    Dyslipidemia    Current Assessment & Plan     Continue Lipitor 20 mg nightly.  Continue with heart healthy low-fat low-cholesterol diet.  Exercise as tolerated.  Add Omega 3 fatty fish oil supplements.         Relevant Medications    atorvastatin (LIPITOR) 20 MG tablet    Chest pain - Primary    Current Assessment & Plan     Offered patient repeat stress test however she does not wish to undergo this at this time.  Believes her chest pain is unlikely cardiac related.  She believes it could be from intermittent reflux (just started back on her daily Prilosec).             Endocrine    Severe obesity (BMI 35.0-39.9) with comorbidity    Current Assessment & Plan     Body mass index is 35.74 kg/m². Morbid obesity complicates all aspects of disease management from diagnostic modalities to treatment. Weight loss encouraged and health benefits explained to patient.               GI    Gastroesophageal reflux disease    Relevant Medications    omeprazole (PRILOSEC) 20 MG capsule     Other Visit Diagnoses       Seasonal allergic rhinitis due to other allergic trigger        Relevant Medications    fluticasone propionate (FLONASE) 50 mcg/actuation nasal spray            Follow up in about 6 months (around 3/20/2025).

## 2024-09-20 NOTE — ASSESSMENT & PLAN NOTE
Body mass index is 35.74 kg/m². Morbid obesity complicates all aspects of disease management from diagnostic modalities to treatment. Weight loss encouraged and health benefits explained to patient.

## 2024-09-20 NOTE — ASSESSMENT & PLAN NOTE
/82. States her BP has been running low 100s-120s/80s at home. This is causing her to feel badly. Will try reducing labetolol to 100 mg BID and continue Irbesartan

## 2024-09-23 NOTE — ASSESSMENT & PLAN NOTE
Continue Lipitor 20 mg nightly.  Continue with heart healthy low-fat low-cholesterol diet.  Exercise as tolerated.  Add Omega 3 fatty fish oil supplements.

## 2024-10-08 ENCOUNTER — TELEPHONE (OUTPATIENT)
Dept: FAMILY MEDICINE | Facility: CLINIC | Age: 74
End: 2024-10-08
Payer: MEDICARE

## 2024-10-08 DIAGNOSIS — M79.676 PAIN OF TOE, UNSPECIFIED LATERALITY: Primary | ICD-10-CM

## 2024-10-08 NOTE — TELEPHONE ENCOUNTER
----- Message from Gina sent at 10/8/2024  9:21 AM CDT -----  Pt needs a referral to a podiatrist. Please advise. Pt #[phone]

## 2024-10-28 ENCOUNTER — TELEPHONE (OUTPATIENT)
Dept: FAMILY MEDICINE | Facility: CLINIC | Age: 74
End: 2024-10-28
Payer: MEDICARE

## 2024-11-26 ENCOUNTER — TELEPHONE (OUTPATIENT)
Dept: FAMILY MEDICINE | Facility: CLINIC | Age: 74
End: 2024-11-26
Payer: MEDICARE

## 2024-11-26 NOTE — TELEPHONE ENCOUNTER
----- Message from Rosa sent at 11/26/2024  9:07 AM CST -----  Pt is going to [place] next week and would like keflex to take with her in case she gets sick   [facility]   [phone]

## 2024-12-23 ENCOUNTER — OFFICE VISIT (OUTPATIENT)
Dept: URGENT CARE | Facility: CLINIC | Age: 74
End: 2024-12-23
Payer: MEDICARE

## 2024-12-23 VITALS
HEIGHT: 60 IN | RESPIRATION RATE: 14 BRPM | BODY MASS INDEX: 35.34 KG/M2 | SYSTOLIC BLOOD PRESSURE: 176 MMHG | HEART RATE: 69 BPM | OXYGEN SATURATION: 98 % | DIASTOLIC BLOOD PRESSURE: 69 MMHG | TEMPERATURE: 98 F | WEIGHT: 180 LBS

## 2024-12-23 DIAGNOSIS — B34.9 PHARYNGITIS WITH VIRAL SYNDROME: Primary | ICD-10-CM

## 2024-12-23 DIAGNOSIS — J02.9 SORE THROAT: ICD-10-CM

## 2024-12-23 DIAGNOSIS — J02.9 PHARYNGITIS WITH VIRAL SYNDROME: Primary | ICD-10-CM

## 2024-12-23 LAB
CTP QC/QA: YES
FLUAV AG NPH QL: NEGATIVE
FLUBV AG NPH QL: NEGATIVE
S PYO RRNA THROAT QL PROBE: NEGATIVE
SARS-COV-2 AG RESP QL IA.RAPID: NEGATIVE

## 2024-12-23 PROCEDURE — 87804 INFLUENZA ASSAY W/OPTIC: CPT | Mod: QW,,, | Performed by: NURSE PRACTITIONER

## 2024-12-23 PROCEDURE — 87811 SARS-COV-2 COVID19 W/OPTIC: CPT | Mod: QW,S$GLB,, | Performed by: NURSE PRACTITIONER

## 2024-12-23 PROCEDURE — 87880 STREP A ASSAY W/OPTIC: CPT | Mod: QW,,, | Performed by: NURSE PRACTITIONER

## 2024-12-23 PROCEDURE — 99214 OFFICE O/P EST MOD 30 MIN: CPT | Mod: 25,S$GLB,, | Performed by: NURSE PRACTITIONER

## 2024-12-23 PROCEDURE — 96372 THER/PROPH/DIAG INJ SC/IM: CPT | Mod: S$GLB,,, | Performed by: NURSE PRACTITIONER

## 2024-12-23 RX ORDER — DEXAMETHASONE SODIUM PHOSPHATE 4 MG/ML
4 INJECTION, SOLUTION INTRA-ARTICULAR; INTRALESIONAL; INTRAMUSCULAR; INTRAVENOUS; SOFT TISSUE
Status: DISCONTINUED | OUTPATIENT
Start: 2024-12-23 | End: 2024-12-23

## 2024-12-23 RX ORDER — DEXAMETHASONE SODIUM PHOSPHATE 4 MG/ML
4 INJECTION, SOLUTION INTRA-ARTICULAR; INTRALESIONAL; INTRAMUSCULAR; INTRAVENOUS; SOFT TISSUE
Status: COMPLETED | OUTPATIENT
Start: 2024-12-23 | End: 2024-12-23

## 2024-12-23 RX ADMIN — DEXAMETHASONE SODIUM PHOSPHATE 4 MG: 4 INJECTION, SOLUTION INTRA-ARTICULAR; INTRALESIONAL; INTRAMUSCULAR; INTRAVENOUS; SOFT TISSUE at 05:12

## 2024-12-23 NOTE — PATIENT INSTRUCTIONS
Pharyngitis   If your condition worsens or fails to improve we recommend that you receive another evaluation at the ER immediately or contact your PCP to discuss your concerns or return here. You must understand that you've received an urgent care treatment only and that you may be released before all your medical problems are known or treated. You the patient will arrange for followup care as instructed.   If the strept culture was done and returns negative in 3-5 days and you are still having a sore throat, you may need to get a mono spot test done or repeated.   Tylenol or ibuprofen for pain may help as long as you are not allergic to these meds or have a medical condition such as stomach ulcers, liver or kidney disease or taking blood thinners etc that would prevent you from using these medications.   Rest and fluids will help as well.   If you were prescribed antibiotics and are female and on BCP use additional methods to prevent pregnancy while on the antibiotics and for one cycle after.    General Discharge Instructions   If you were prescribed a narcotic or controlled medication, do not drive or operate heavy equipment or machinery while taking these medications.  If you were prescribed antibiotics, please take them to completion.  You must understand that you've received an Urgent Care treatment only and that you may be released before all your medical problems are known or treated. You, the patient, will arrange for follow up care as instructed.  Follow up with your PCP or specialty clinic as directed in the next 1-2 weeks if not improved or as needed.  You can call [phone] to schedule an appointment with the appropriate provider.  If your condition worsens we recommend that you receive another evaluation at the emergency room immediately or contact your primary medical clinics after hours call service to discuss your concerns.  Please return  here or go to the Emergency Department for any concerns or worsening of condition.      WE CANNOT RULE OUT ALL POSSIBLE CAUSES OF YOUR SYMPTOMS IN THE URGENT CARE SETTING PLEASE GO TO THE ER IF YOU FEELS YOUR CONDITION IS WORSENING OR YOU WOULD LIKE EMERGENT EVALUATION.

## 2024-12-23 NOTE — PROGRESS NOTES
Subjective:      Patient ID: Erlinda Reaves is a 74 y.o. female.    Vitals:  height is 5' (1.524 m) and weight is 81.6 kg (180 lb). Her oral temperature is 97.6 °F (36.4 °C). Her blood pressure is 176/69 (abnormal) and her pulse is 69. Her respiration is 14 and oxygen saturation is 98%.     Chief Complaint: Sore Throat    74-year-old female presents to urgent care for evaluation of sore throat and cough for 2-3 days.  She also reports a runny nose.  She denies shortness of breath chest pain difficulty breathing.  Patient states that she will like a steroid shot and her Dr. always gives only makes it feel better.    Sore Throat   The current episode started in the past 7 days (2-3 days ago). Associated symptoms include coughing. Associated symptoms comments: Runny nose  .       HENT:  Positive for sore throat.    Respiratory:  Positive for cough.       Objective:     Physical Exam   Constitutional:  Non-toxic appearance. She does not appear ill. No distress.   HENT:   Head: Normocephalic and atraumatic.   Ears:   Right Ear: External ear normal.   Left Ear: External ear normal.   Mouth/Throat: Mucous membranes are moist. Oropharynx is clear.      Comments: +post nasal drip  Eyes: Extraocular movement intact   Pulmonary/Chest: Effort normal and breath sounds normal.   Abdominal: Normal appearance.   Neurological: no focal deficit. She is alert.   Skin: Skin is warm and not diaphoretic. Capillary refill takes less than 2 seconds.   Nursing note and vitals reviewed.        Results for orders placed or performed in visit on 12/23/24   SARS Coronavirus 2 Antigen, POCT Manual Read    Collection Time: 12/23/24  4:43 PM   Result Value Ref Range    SARS Coronavirus 2 Antigen Negative Negative     Acceptable Yes    POCT Influenza A/B Rapid Antigen    Collection Time: 12/23/24  4:43 PM   Result Value Ref Range    Rapid Influenza A Ag Negative Negative    Rapid Influenza B Ag Negative Negative      Acceptable Yes    POCT rapid strep A    Collection Time: 12/23/24  4:43 PM   Result Value Ref Range    Rapid Strep A Screen Negative Negative     Acceptable Yes       Assessment:     1. Pharyngitis with viral syndrome    2. Sore throat        Plan:   Lengthy discussion with patient regarding viral etiology.  Patient states that she would like a steroid injection.  She has been educated on the risks of steroid injections given her hypertension.  Patient states that her blood pressure is controlled.    Pharyngitis with viral syndrome  -     dexAMETHasone injection 4 mg    Sore throat  -     SARS Coronavirus 2 Antigen, POCT Manual Read  -     POCT Influenza A/B Rapid Antigen  -     POCT rapid strep A  -     dexAMETHasone injection 4 mg                Patient Instructions                                                         Pharyngitis   If your condition worsens or fails to improve we recommend that you receive another evaluation at the ER immediately or contact your PCP to discuss your concerns or return here. You must understand that you've received an urgent care treatment only and that you may be released before all your medical problems are known or treated. You the patient will arrange for followup care as instructed.   If the strept culture was done and returns negative in 3-5 days and you are still having a sore throat, you may need to get a mono spot test done or repeated.   Tylenol or ibuprofen for pain may help as long as you are not allergic to these meds or have a medical condition such as stomach ulcers, liver or kidney disease or taking blood thinners etc that would prevent you from using these medications.   Rest and fluids will help as well.   If you were prescribed antibiotics and are female and on BCP use additional methods to prevent pregnancy while on the antibiotics and for one cycle after.    General Discharge Instructions   If you were prescribed a narcotic or controlled  medication, do not drive or operate heavy equipment or machinery while taking these medications.  If you were prescribed antibiotics, please take them to completion.  You must understand that you've received an Urgent Care treatment only and that you may be released before all your medical problems are known or treated. You, the patient, will arrange for follow up care as instructed.  Follow up with your PCP or specialty clinic as directed in the next 1-2 weeks if not improved or as needed.  You can call [phone] to schedule an appointment with the appropriate provider.  If your condition worsens we recommend that you receive another evaluation at the emergency room immediately or contact your primary medical clinics after hours call service to discuss your concerns.  Please return here or go to the Emergency Department for any concerns or worsening of condition.      WE CANNOT RULE OUT ALL POSSIBLE CAUSES OF YOUR SYMPTOMS IN THE URGENT CARE SETTING PLEASE GO TO THE ER IF YOU FEELS YOUR CONDITION IS WORSENING OR YOU WOULD LIKE EMERGENT EVALUATION.

## 2025-01-28 ENCOUNTER — TELEPHONE (OUTPATIENT)
Dept: FAMILY MEDICINE | Facility: CLINIC | Age: 75
End: 2025-01-28
Payer: MEDICARE

## 2025-02-24 DIAGNOSIS — Z00.00 ENCOUNTER FOR MEDICARE ANNUAL WELLNESS EXAM: ICD-10-CM

## 2025-02-25 ENCOUNTER — TELEPHONE (OUTPATIENT)
Dept: FAMILY MEDICINE | Facility: CLINIC | Age: 75
End: 2025-02-25
Payer: MEDICARE

## 2025-02-25 DIAGNOSIS — Z12.31 SCREENING MAMMOGRAM FOR BREAST CANCER: Primary | ICD-10-CM

## 2025-02-25 NOTE — TELEPHONE ENCOUNTER
----- Message from Jamarcus Zaman sent at 2/25/2025  8:23 AM CST -----  Patient is calling requesting to have a mammogram order sent to [facility].Pt: [phone] or cell [phone]

## 2025-02-28 NOTE — TELEPHONE ENCOUNTER
----- Message from Sky Carrington sent at 2/21/2022  9:50 AM CST -----  Contact: Self  Pt is calling to see if the office received a copy of her lab work from her doctor. Please call pt back at [phone] to update and advise please.     
Tried calling her pcp office, no one answered the phone after 15 minutes on hold. I will try again  
1. Expect a call from Endoscopy the day before your procedure between 11am and 3pm.  2. Follow the GI doctor's instructions for preparation  and day before procedure activities.  3. Follow the GI doctor's  instructions for medications.   4. cbc, bmp, ptt/inr,  done today in PST

## 2025-03-05 ENCOUNTER — RESULTS FOLLOW-UP (OUTPATIENT)
Dept: FAMILY MEDICINE | Facility: CLINIC | Age: 75
End: 2025-03-05

## 2025-03-05 ENCOUNTER — HOSPITAL ENCOUNTER (OUTPATIENT)
Dept: RADIOLOGY | Facility: HOSPITAL | Age: 75
Discharge: HOME | End: 2025-03-05
Attending: NURSE PRACTITIONER
Payer: MEDICARE

## 2025-03-05 VITALS — HEIGHT: 60 IN | WEIGHT: 180 LBS | BODY MASS INDEX: 35.34 KG/M2

## 2025-03-05 DIAGNOSIS — Z12.31 SCREENING MAMMOGRAM FOR BREAST CANCER: ICD-10-CM

## 2025-03-05 PROCEDURE — 77063 BREAST TOMOSYNTHESIS BI: CPT | Mod: 26,,, | Performed by: RADIOLOGY

## 2025-03-05 PROCEDURE — 77067 SCR MAMMO BI INCL CAD: CPT | Mod: 26,,, | Performed by: RADIOLOGY

## 2025-03-05 PROCEDURE — 77063 BREAST TOMOSYNTHESIS BI: CPT | Mod: TC,PO

## 2025-03-12 ENCOUNTER — TELEPHONE (OUTPATIENT)
Dept: FAMILY MEDICINE | Facility: CLINIC | Age: 75
End: 2025-03-12
Payer: MEDICARE

## 2025-03-12 DIAGNOSIS — E78.5 DYSLIPIDEMIA: ICD-10-CM

## 2025-03-12 DIAGNOSIS — Z79.899 ENCOUNTER FOR LONG-TERM (CURRENT) USE OF MEDICATIONS: Primary | ICD-10-CM

## 2025-03-12 DIAGNOSIS — I10 ESSENTIAL HYPERTENSION: ICD-10-CM

## 2025-03-19 NOTE — PROGRESS NOTES
SUBJECTIVE:      Patient ID: Erlinda Reaves is a 74 y.o. female.    Chief Complaint: No chief complaint on file.    HPI  History of Present Illness              Past Surgical History:   Procedure Laterality Date     SECTION      COLONOSCOPY      COLONOSCOPY  2023    3 RECALL    TONSILLECTOMY       Family History   Problem Relation Name Age of Onset    Hypertension Sister      Hypertension Sister      Breast cancer Neg Hx      Colon cancer Neg Hx        Social History     Socioeconomic History    Marital status:     Number of children: 1   Occupational History    Occupation: retired   Tobacco Use    Smoking status: Never     Passive exposure: Never    Smokeless tobacco: Never   Substance and Sexual Activity    Alcohol use: Never    Drug use: Never    Sexual activity: Not Currently   Social History Narrative    Live alone     Current Medications[1]  Review of patient's allergies indicates:   Allergen Reactions    Doxycycline Nausea Only      Past Medical History:   Diagnosis Date    Chronic sinusitis     Diverticulosis of large intestine without perforation or abscess without bleeding 2023    Hyperlipemia     Hypertension     IBS (irritable bowel syndrome)     Insomnia     Osteoarthritis     Personal history of colonic polyps 2023     Past Surgical History:   Procedure Laterality Date     SECTION      COLONOSCOPY      COLONOSCOPY  2023    3 RECALL    TONSILLECTOMY         Review of Systems   Constitutional:  Negative for appetite change, chills, diaphoresis and unexpected weight change.   HENT:  Negative for ear discharge, hearing loss, trouble swallowing and voice change.    Eyes:  Negative for photophobia and pain.   Respiratory:  Negative for chest tightness and stridor.    Cardiovascular:  Negative for chest pain.   Gastrointestinal:  Negative for blood in stool and vomiting.   Endocrine: Negative for cold intolerance and heat intolerance.   Genitourinary:   Negative for difficulty urinating and flank pain.   Musculoskeletal:  Negative for joint swelling and neck stiffness.   Skin:  Negative for pallor.   Neurological:  Negative for speech difficulty.   Hematological:  Does not bruise/bleed easily.   Psychiatric/Behavioral:  Negative for confusion.       OBJECTIVE:      There were no vitals filed for this visit.  Physical Exam  Vitals and nursing note reviewed.   Constitutional:       General: She is not in acute distress.     Appearance: She is well-developed.   HENT:      Head: Normocephalic and atraumatic.      Right Ear: Tympanic membrane normal.      Left Ear: Tympanic membrane normal.      Nose: Nose normal.      Mouth/Throat:      Pharynx: Uvula midline.   Eyes:      General: Lids are normal.      Conjunctiva/sclera: Conjunctivae normal.      Pupils: Pupils are equal, round, and reactive to light.      Right eye: Pupil is round and reactive.      Left eye: Pupil is round and reactive.   Neck:      Thyroid: No thyromegaly.      Vascular: No JVD.      Trachea: Trachea normal.   Cardiovascular:      Rate and Rhythm: Normal rate and regular rhythm.      Pulses: Normal pulses.      Heart sounds: Normal heart sounds.   Pulmonary:      Effort: Pulmonary effort is normal. No tachypnea or respiratory distress.      Breath sounds: Normal breath sounds.   Abdominal:      General: Bowel sounds are normal.      Palpations: Abdomen is soft.      Tenderness: There is no abdominal tenderness.   Musculoskeletal:         General: Normal range of motion.      Cervical back: Normal range of motion and neck supple.   Lymphadenopathy:      Cervical: No cervical adenopathy.   Skin:     General: Skin is warm and dry.      Findings: No rash.   Neurological:      Mental Status: She is alert and oriented to person, place, and time.   Psychiatric:         Mood and Affect: Mood normal.         Speech: Speech normal.         Behavior: Behavior normal. Behavior is cooperative.         Thought  Content: Thought content normal.       No visits with results within 1 Month(s) from this visit.   Latest known visit with results is:   Office Visit on 12/23/2024   Component Date Value Ref Range Status    SARS Coronavirus 2 Antigen 12/23/2024 Negative  Negative Final     Acceptable 12/23/2024 Yes   Final    Rapid Influenza A Ag 12/23/2024 Negative  Negative Final    Rapid Influenza B Ag 12/23/2024 Negative  Negative Final     Acceptable 12/23/2024 Yes   Final    Rapid Strep A Screen 12/23/2024 Negative  Negative Final     Acceptable 12/23/2024 Yes   Final      Assessment:       No diagnosis found.    Plan:       There are no diagnoses linked to this encounter.  Assessment & Plan              No follow-ups on file.  This note was generated with the assistance of ambient listening technology. Verbal consent was obtained by the patient and accompanying visitor(s) for the recording of patient appointment to facilitate this note. I attest to having reviewed and edited the generated note for accuracy, though some syntax or spelling errors may persist. Please contact the author of this note for any clarification.        3/19/2025 RAMILA Rios, FNP             [1]   Current Outpatient Medications   Medication Sig Dispense Refill    atorvastatin (LIPITOR) 20 MG tablet Take 1 tablet (20 mg total) by mouth once daily. 90 tablet 3    cholecalciferol, vitamin D3, (VITAMIN D3) 125 mcg (5,000 unit) Tab Take 5,000 Units by mouth once daily.      cinnamon bark (CINNAMON) 500 mg capsule Take 500 mg by mouth once daily.      clobetasol 0.05% (TEMOVATE) 0.05 % Oint Apply 1 Application topically Daily.      coenzyme Q10 (COQ-10) 100 mg capsule Take 100 mg by mouth once daily.      fluticasone propionate (FLONASE) 50 mcg/actuation nasal spray 1 spray (50 mcg total) by Each Nostril route 2 (two) times daily. 11.1 mL 1    furosemide (LASIX) 20 MG tablet Take 1 tablet (20 mg total) by  mouth daily as needed (prn). 90 tablet 1    garlic 100 mg Tab Take 1 tablet by mouth Daily.      guaiFENesin (MUCINEX) 600 mg 12 hr tablet Take 1,200 mg by mouth daily as needed.      irbesartan (AVAPRO) 300 MG tablet Take 1 tablet (300 mg total) by mouth once daily. 90 tablet 3    labetaloL (NORMODYNE) 100 MG tablet Take 1 tablet (100 mg total) by mouth 2 (two) times daily. 180 tablet 1    LIDOcaine HCl-hydrocortison ac (LIDAMANTLE-HC) 3-0.5 % topical cream Apply 1 packet topically 2 (two) times a day.      loratadine (CLARITIN) 10 mg tablet Take 10 mg by mouth once daily.      magnesium oxide (MAG-OX) 400 mg (241.3 mg magnesium) tablet Take 400 mg by mouth once daily.      multivitamin (ONE DAILY MULTIVITAMIN) per tablet Take 1 tablet by mouth once daily.      omega-3 fatty acids 1,000 mg Cap Take 1 capsule (1,000 mg total) by mouth once daily. 90 capsule 3    omeprazole (PRILOSEC) 20 MG capsule Take 1 capsule (20 mg total) by mouth once daily. 90 capsule 1    polyethylene glycol (GLYCOLAX) 17 gram/dose powder Take 17 g by mouth once daily.      promethazine (PHENERGAN) 12.5 MG Tab Take 1 tablet by mouth daily as needed.      RESTASIS 0.05 % ophthalmic emulsion Place 1 drop into both eyes 2 (two) times daily.      traZODone (DESYREL) 50 MG tablet Take 1 tablet (50 mg total) by mouth nightly as needed for Insomnia. 90 tablet 1     No current facility-administered medications for this visit.

## 2025-03-20 ENCOUNTER — TELEPHONE (OUTPATIENT)
Dept: FAMILY MEDICINE | Facility: CLINIC | Age: 75
End: 2025-03-20

## 2025-03-20 ENCOUNTER — OFFICE VISIT (OUTPATIENT)
Dept: FAMILY MEDICINE | Facility: CLINIC | Age: 75
End: 2025-03-20
Payer: MEDICARE

## 2025-03-20 ENCOUNTER — RESULTS FOLLOW-UP (OUTPATIENT)
Dept: FAMILY MEDICINE | Facility: CLINIC | Age: 75
End: 2025-03-20

## 2025-03-20 VITALS — HEART RATE: 65 BPM | HEIGHT: 60 IN | WEIGHT: 185 LBS | BODY MASS INDEX: 36.32 KG/M2 | OXYGEN SATURATION: 99 %

## 2025-03-20 DIAGNOSIS — E78.5 DYSLIPIDEMIA: ICD-10-CM

## 2025-03-20 DIAGNOSIS — M85.80 OSTEOPENIA, UNSPECIFIED LOCATION: ICD-10-CM

## 2025-03-20 DIAGNOSIS — F51.01 PRIMARY INSOMNIA: ICD-10-CM

## 2025-03-20 DIAGNOSIS — J30.89 SEASONAL ALLERGIC RHINITIS DUE TO OTHER ALLERGIC TRIGGER: ICD-10-CM

## 2025-03-20 DIAGNOSIS — I10 ESSENTIAL HYPERTENSION: Primary | ICD-10-CM

## 2025-03-20 DIAGNOSIS — E66.01 SEVERE OBESITY (BMI 35.0-39.9) WITH COMORBIDITY: ICD-10-CM

## 2025-03-20 PROCEDURE — 99214 OFFICE O/P EST MOD 30 MIN: CPT | Mod: S$GLB,,, | Performed by: NURSE PRACTITIONER

## 2025-03-20 RX ORDER — FLUTICASONE PROPIONATE 50 MCG
1 SPRAY, SUSPENSION (ML) NASAL 2 TIMES DAILY
Qty: 11.1 ML | Refills: 1 | Status: SHIPPED | OUTPATIENT
Start: 2025-03-20

## 2025-03-20 RX ORDER — TRAZODONE HYDROCHLORIDE 50 MG/1
50 TABLET ORAL NIGHTLY PRN
Qty: 90 TABLET | Refills: 1 | Status: SHIPPED | OUTPATIENT
Start: 2025-03-20

## 2025-03-20 NOTE — TELEPHONE ENCOUNTER
----- Message from Jamarcus Saba sent at 3/20/2025  3:27 PM CDT -----  Pt states she just left apt and forgot to ask if she should be seeing a cardiologist Pt # [phone]  
03-Mar-2021

## 2025-03-20 NOTE — PROGRESS NOTES
SUBJECTIVE:      Patient ID: Erlinda Reaves is a 74 y.o. female.    Chief Complaint: Hypertension     Follows with cardiology Dr Puente. Following with ENT and asking for a refill for flonase     Hypertension  This is a chronic problem. The current episode started more than 1 year ago. The problem is unchanged. The problem is controlled. Pertinent negatives include no chest pain, headaches, palpitations, peripheral edema or shortness of breath. Risk factors for coronary artery disease include dyslipidemia. Past treatments include beta blockers, angiotensin blockers and diuretics. The current treatment provides moderate improvement. Compliance problems include exercise.    Hyperlipidemia  This is a chronic problem. The current episode started more than 1 year ago. The problem is controlled. Recent lipid tests were reviewed and are normal. Pertinent negatives include no chest pain or shortness of breath. Current antihyperlipidemic treatment includes statins. Risk factors for coronary artery disease include hypertension.   Gastroesophageal Reflux  She complains of heartburn. She reports no abdominal pain, no chest pain, no coughing or no wheezing. This is a chronic problem. The current episode started more than 1 year ago. The problem occurs occasionally. The problem has been unchanged. The heartburn is located in the abdomen. The heartburn is of mild intensity. The heartburn does not wake her from sleep. The heartburn does not limit her activity. The heartburn doesn't change with position.       Past Surgical History:   Procedure Laterality Date     SECTION      COLONOSCOPY      COLONOSCOPY  2023    3 RECALL    TONSILLECTOMY       Family History   Problem Relation Name Age of Onset    Hypertension Sister      Hypertension Sister      Breast cancer Neg Hx      Colon cancer Neg Hx        Social History     Socioeconomic History    Marital status:     Number of children: 1   Occupational History     Occupation: retired   Tobacco Use    Smoking status: Never     Passive exposure: Never    Smokeless tobacco: Never   Substance and Sexual Activity    Alcohol use: Never    Drug use: Never    Sexual activity: Not Currently   Social History Narrative    Live alone     Current Medications[1]  Review of patient's allergies indicates:   Allergen Reactions    Doxycycline Nausea Only      Past Medical History:   Diagnosis Date    Chronic sinusitis     Diverticulosis of large intestine without perforation or abscess without bleeding 2023    Hyperlipemia     Hypertension     IBS (irritable bowel syndrome)     Insomnia     Osteoarthritis     Personal history of colonic polyps 2023     Past Surgical History:   Procedure Laterality Date     SECTION      COLONOSCOPY      COLONOSCOPY  2023    3 RECALL    TONSILLECTOMY         Review of Systems   Constitutional:  Negative for appetite change, chills, diaphoresis and unexpected weight change.   HENT:  Negative for ear discharge, hearing loss, trouble swallowing and voice change.    Eyes:  Negative for photophobia and pain.   Respiratory:  Negative for cough, chest tightness, shortness of breath, wheezing and stridor.    Cardiovascular:  Negative for chest pain and palpitations.   Gastrointestinal:  Positive for heartburn. Negative for abdominal pain, blood in stool and vomiting.   Endocrine: Negative for cold intolerance and heat intolerance.   Genitourinary:  Negative for difficulty urinating and flank pain.   Musculoskeletal:  Negative for joint swelling and neck stiffness.   Skin:  Negative for pallor.   Neurological:  Negative for dizziness, speech difficulty, weakness and headaches.   Hematological:  Does not bruise/bleed easily.   Psychiatric/Behavioral:  Negative for confusion, dysphoric mood, self-injury, sleep disturbance and suicidal ideas. The patient is not nervous/anxious.       OBJECTIVE:      Vitals:    25 1304   BP: (!) (P) 140/70    Pulse: 65   SpO2: 99%   Weight: 83.9 kg (185 lb)   Height: 5' (1.524 m)     Physical Exam  Vitals and nursing note reviewed.   Constitutional:       General: She is not in acute distress.     Appearance: She is well-developed.   HENT:      Head: Normocephalic and atraumatic.      Right Ear: Tympanic membrane normal.      Left Ear: Tympanic membrane normal.      Nose: Nose normal.      Mouth/Throat:      Pharynx: Uvula midline.   Eyes:      General: Lids are normal.      Conjunctiva/sclera: Conjunctivae normal.      Pupils: Pupils are equal, round, and reactive to light.      Right eye: Pupil is round and reactive.      Left eye: Pupil is round and reactive.   Neck:      Thyroid: No thyromegaly.      Vascular: No JVD.      Trachea: Trachea normal.   Cardiovascular:      Rate and Rhythm: Normal rate and regular rhythm.      Pulses: Normal pulses.      Heart sounds: Normal heart sounds.   Pulmonary:      Effort: Pulmonary effort is normal. No tachypnea or respiratory distress.      Breath sounds: Normal breath sounds. No wheezing, rhonchi or rales.   Abdominal:      General: Bowel sounds are normal.      Palpations: Abdomen is soft.      Tenderness: There is no abdominal tenderness.   Musculoskeletal:         General: Normal range of motion.      Cervical back: Normal range of motion and neck supple.   Lymphadenopathy:      Cervical: No cervical adenopathy.   Skin:     General: Skin is warm and dry.      Findings: No rash.   Neurological:      Mental Status: She is alert and oriented to person, place, and time.   Psychiatric:         Mood and Affect: Mood normal.         Speech: Speech normal.         Behavior: Behavior normal. Behavior is cooperative.         Thought Content: Thought content normal.         Judgment: Judgment normal.          Telephone on 03/12/2025   Component Date Value Ref Range Status    Color, UA 03/19/2025 YELLOW  YELLOW Final    Appearance, UA 03/19/2025 CLEAR  CLEAR Final    Specific  Gravity, UA 03/19/2025 1.015  1.001 - 1.035 Final    pH, UA 03/19/2025 7.5  5.0 - 8.0 Final    Glucose, UA 03/19/2025 NEGATIVE  NEGATIVE Final    Bilirubin, UA 03/19/2025 NEGATIVE  NEGATIVE Final    Ketones, UA 03/19/2025 NEGATIVE  NEGATIVE Final    Occult Blood UA 03/19/2025 NEGATIVE  NEGATIVE Final    Protein, UA 03/19/2025 NEGATIVE  NEGATIVE Final    Nitrite, UA 03/19/2025 NEGATIVE  NEGATIVE Final    Leukocytes, UA 03/19/2025 TRACE (A)  NEGATIVE Final    WBC Casts, UA 03/19/2025 NONE SEEN  < OR = 5 /HPF Final    RBC Casts, UA 03/19/2025 NONE SEEN  < OR = 2 /HPF Final    Squam Epithel, UA 03/19/2025 0-5  < OR = 5 /HPF Final    Bacteria, UA 03/19/2025 NONE SEEN  NONE SEEN /HPF Final    Hyaline Casts, UA 03/19/2025 NONE SEEN  NONE SEEN /LPF Final    Service Cmt: 03/19/2025 SEE COMMENT   Final    Comment: This urine was analyzed for the presence of WBC,   RBC, bacteria, casts, and other formed elements.   Only those elements seen were reported.            Reflexive Urine Culture 03/19/2025 SEE COMMENT   Final    CULTURE INDICATED - RESULTS TO FOLLOW    TSH w/reflex to FT4 03/19/2025 4.30  0.40 - 4.50 mIU/L Final    Cholesterol 03/19/2025 183  <200 mg/dL Final    HDL 03/19/2025 76  > OR = 50 mg/dL Final    Triglycerides 03/19/2025 108  <150 mg/dL Final    LDL Cholesterol 03/19/2025 87  mg/dL (calc) Final    Comment: Reference range: <100     Desirable range <100 mg/dL for primary prevention;    <70 mg/dL for patients with CHD or diabetic patients   with > or = 2 CHD risk factors.     LDL-C is now calculated using the Jorge-Gladys   calculation, which is a validated novel method providing   better accuracy than the Friedewald equation in the   estimation of LDL-C.   Jorge AVILEZ et al. [facility]. 2013;310(19): 1380-3116   (http://education.[employer]/faq/KDC554)      HDL/Cholesterol Ratio 03/19/2025 2.4  <5.0 (calc) Final    Non HDL Chol. (LDL+VLDL) 03/19/2025 107  <130 mg/dL (calc) Final    Comment: For patients  with diabetes plus 1 major ASCVD risk   factor, treating to a non-HDL-C goal of <100 mg/dL   (LDL-C of <70 mg/dL) is considered a therapeutic   option.      WBC 03/19/2025 4.7  3.8 - 10.8 Thousand/uL Final    RBC 03/19/2025 3.98  3.80 - 5.10 Million/uL Final    Hemoglobin 03/19/2025 12.6  11.7 - 15.5 g/dL Final    Hematocrit 03/19/2025 38.1  35.0 - 45.0 % Final    MCV 03/19/2025 95.7  80.0 - 100.0 fL Final    MCH 03/19/2025 31.7  27.0 - 33.0 pg Final    MCHC 03/19/2025 33.1  32.0 - 36.0 g/dL Final    Comment: For adults, a slight decrease in the calculated MCHC  value (in the range of 30 to 32 g/dL) is most likely  not clinically significant; however, it should be  interpreted with caution in correlation with other  red cell parameters and the patient's clinical  condition.      RDW 03/19/2025 13.3  11.0 - 15.0 % Final    Platelets 03/19/2025 203  140 - 400 Thousand/uL Final    MPV 03/19/2025 10.2  7.5 - 12.5 fL Final    Neutrophils, Abs 03/19/2025 2,524  1,500 - 7,800 cells/uL Final    Lymph # 03/19/2025 1,636  850 - 3,900 cells/uL Final    Mono # 03/19/2025 385  200 - 950 cells/uL Final    Eos # 03/19/2025 113  15 - 500 cells/uL Final    Baso # 03/19/2025 42  0 - 200 cells/uL Final    Neutrophils Relative 03/19/2025 53.7  % Final    Lymph % 03/19/2025 34.8  % Final    Mono % 03/19/2025 8.2  % Final    Eosinophil % 03/19/2025 2.4  % Final    Basophil % 03/19/2025 0.9  % Final    Glucose 03/19/2025 84  65 - 99 mg/dL Final    Comment:               Fasting reference interval         BUN 03/19/2025 18  7 - 25 mg/dL Final    Creatinine 03/19/2025 0.84  0.60 - 1.00 mg/dL Final    eGFR 03/19/2025 73  > OR = 60 mL/min/1.73m2 Final    BUN/Creatinine Ratio 03/19/2025 SEE NOTE:  6 - 22 (calc) Final    Comment:    Not Reported: BUN and Creatinine are within     reference range.            Sodium 03/19/2025 141  135 - 146 mmol/L Final    Potassium 03/19/2025 4.4  3.5 - 5.3 mmol/L Final    Chloride 03/19/2025 101  98 - 110  mmol/L Final    CO2 03/19/2025 30  20 - 32 mmol/L Final    Calcium 03/19/2025 9.8  8.6 - 10.4 mg/dL Final    Total Protein 03/19/2025 6.7  6.1 - 8.1 g/dL Final    Albumin 03/19/2025 4.5  3.6 - 5.1 g/dL Final    Globulin, Total 03/19/2025 2.2  1.9 - 3.7 g/dL (calc) Final    Albumin/Globulin Ratio 03/19/2025 2.0  1.0 - 2.5 (calc) Final    Total Bilirubin 03/19/2025 0.7  0.2 - 1.2 mg/dL Final    Alkaline Phosphatase 03/19/2025 84  37 - 153 U/L Final    AST 03/19/2025 26  10 - 35 U/L Final    ALT 03/19/2025 22  6 - 29 U/L Final   ]  Last visit note, most recent available labs, and health maintenance reviewed    Assessment:       1. Essential hypertension    2. Dyslipidemia    3. Primary insomnia    4. Severe obesity (BMI 35.0-39.9) with comorbidity    5. Osteopenia, unspecified location    6. Seasonal allergic rhinitis due to other allergic trigger        Plan:       Essential hypertension  Stable on current meds    Dyslipidemia  Stable on current meds    Primary insomnia  -     traZODone (DESYREL) 50 MG tablet; Take 1 tablet (50 mg total) by mouth nightly as needed for Insomnia.  Dispense: 90 tablet; Refill: 1    Severe obesity (BMI 35.0-39.9) with comorbidity  Cont to work on diet and exercise     Osteopenia  Cont calcium and D3  Cont weight bearing exercises    Seasonal allergic rhinitis  Flonase sent    Follow up in about 6 months (around 9/20/2025) for htn.      3/20/2025 RAMILA Rios, FNP             [1]   Current Outpatient Medications   Medication Sig Dispense Refill    atorvastatin (LIPITOR) 20 MG tablet Take 1 tablet (20 mg total) by mouth once daily. 90 tablet 3    cholecalciferol, vitamin D3, (VITAMIN D3) 125 mcg (5,000 unit) Tab Take 5,000 Units by mouth once daily.      cinnamon bark (CINNAMON) 500 mg capsule Take 500 mg by mouth once daily.      clobetasol 0.05% (TEMOVATE) 0.05 % Oint Apply 1 Application topically Daily.      coenzyme Q10 (COQ-10) 100 mg capsule Take 100 mg by mouth once daily.       furosemide (LASIX) 20 MG tablet Take 1 tablet (20 mg total) by mouth daily as needed (prn). 90 tablet 1    garlic 100 mg Tab Take 1 tablet by mouth Daily.      guaiFENesin (MUCINEX) 600 mg 12 hr tablet Take 1,200 mg by mouth daily as needed.      irbesartan (AVAPRO) 300 MG tablet Take 1 tablet (300 mg total) by mouth once daily. 90 tablet 3    labetaloL (NORMODYNE) 100 MG tablet Take 1 tablet (100 mg total) by mouth 2 (two) times daily. 180 tablet 1    LIDOcaine HCl-hydrocortison ac (LIDAMANTLE-HC) 3-0.5 % topical cream Apply 1 packet topically 2 (two) times a day.      loratadine (CLARITIN) 10 mg tablet Take 10 mg by mouth once daily.      magnesium oxide (MAG-OX) 400 mg (241.3 mg magnesium) tablet Take 400 mg by mouth once daily.      multivitamin (ONE DAILY MULTIVITAMIN) per tablet Take 1 tablet by mouth once daily.      omega-3 fatty acids 1,000 mg Cap Take 1 capsule (1,000 mg total) by mouth once daily. 90 capsule 3    omeprazole (PRILOSEC) 20 MG capsule Take 1 capsule (20 mg total) by mouth once daily. 90 capsule 1    polyethylene glycol (GLYCOLAX) 17 gram/dose powder Take 17 g by mouth once daily.      promethazine (PHENERGAN) 12.5 MG Tab Take 1 tablet by mouth daily as needed.      RESTASIS 0.05 % ophthalmic emulsion Place 1 drop into both eyes 2 (two) times daily.      fluticasone propionate (FLONASE) 50 mcg/actuation nasal spray 1 spray (50 mcg total) by Each Nostril route 2 (two) times daily. 11.1 mL 1    traZODone (DESYREL) 50 MG tablet Take 1 tablet (50 mg total) by mouth nightly as needed for Insomnia. 90 tablet 1     No current facility-administered medications for this visit.

## 2025-03-24 DIAGNOSIS — N30.00 ACUTE CYSTITIS WITHOUT HEMATURIA: Primary | ICD-10-CM

## 2025-03-24 RX ORDER — NITROFURANTOIN 25; 75 MG/1; MG/1
100 CAPSULE ORAL 2 TIMES DAILY
Qty: 20 CAPSULE | Refills: 0 | Status: SHIPPED | OUTPATIENT
Start: 2025-03-24

## 2025-03-24 NOTE — TELEPHONE ENCOUNTER
----- Message from Quinten Weeks NP sent at 3/24/2025  7:16 AM CDT -----  UTI noted on labs, will need macrobid 100mg bid/10d  ----- Message -----  From: Phillip Parrish  Sent: 3/20/2025   4:13 AM CDT  To: Quinten Weeks NP

## 2025-03-24 NOTE — TELEPHONE ENCOUNTER
Reviewed results, hesitant to take an antibiotic, said she has problems with antibiotics, said she usually takes zpacks, I explained how the culture works and why this medicine is the best. Suggested she take probiotic and eat with the medicine to avoid GI upset

## 2025-03-26 DIAGNOSIS — J30.89 SEASONAL ALLERGIC RHINITIS DUE TO OTHER ALLERGIC TRIGGER: ICD-10-CM

## 2025-03-26 RX ORDER — FLUTICASONE PROPIONATE 50 MCG
1 SPRAY, SUSPENSION (ML) NASAL 2 TIMES DAILY
Qty: 33.3 ML | Refills: 0 | Status: SHIPPED | OUTPATIENT
Start: 2025-03-26

## 2025-03-26 NOTE — TELEPHONE ENCOUNTER
----- Message from Rosa sent at 3/26/2025  9:19 AM CDT -----  Pt got flonase called in yesterday and it is only one bottle. She needs 3 because it is cheaper for her [facility] [phone]

## 2025-03-28 ENCOUNTER — TELEPHONE (OUTPATIENT)
Dept: FAMILY MEDICINE | Facility: CLINIC | Age: 75
End: 2025-03-28
Payer: MEDICARE

## 2025-03-28 NOTE — TELEPHONE ENCOUNTER
----- Message from Rosa sent at 3/28/2025  8:37 AM CDT -----  Please call patient about her flonase because she is not getting 3 inhalers for $5 [phone]

## 2025-06-11 ENCOUNTER — TELEPHONE (OUTPATIENT)
Dept: FAMILY MEDICINE | Facility: CLINIC | Age: 75
End: 2025-06-11
Payer: MEDICARE

## 2025-06-11 NOTE — TELEPHONE ENCOUNTER
----- Message from Rosa sent at 6/11/2025  9:31 AM CDT -----  Pt would like a copy of her last labs and after visit summary [phone]

## 2025-06-27 DIAGNOSIS — I10 ESSENTIAL HYPERTENSION: ICD-10-CM

## 2025-06-27 RX ORDER — LABETALOL 100 MG/1
100 TABLET, FILM COATED ORAL 2 TIMES DAILY
Qty: 180 TABLET | Refills: 0 | Status: SHIPPED | OUTPATIENT
Start: 2025-06-27

## 2025-06-27 NOTE — TELEPHONE ENCOUNTER
----- Message from Stephanie sent at 6/27/2025  9:07 AM CDT -----  I just took a message on this patient. Refill  Labetalol  She need # 180 not  # 90. [facility] [address]. Pt's # [phone] GH

## 2025-06-27 NOTE — TELEPHONE ENCOUNTER
----- Message from Stephanie sent at 6/27/2025  9:02 AM CDT -----  Refills Labetalol 100 mg  # 90 [facility] [address]. Pt's # [phone] GH

## 2025-07-08 ENCOUNTER — OFFICE VISIT (OUTPATIENT)
Dept: CARDIOLOGY | Facility: CLINIC | Age: 75
End: 2025-07-08
Payer: MEDICARE

## 2025-07-08 VITALS
OXYGEN SATURATION: 98 % | HEART RATE: 70 BPM | DIASTOLIC BLOOD PRESSURE: 91 MMHG | SYSTOLIC BLOOD PRESSURE: 140 MMHG | WEIGHT: 183.81 LBS | HEIGHT: 60 IN | BODY MASS INDEX: 36.08 KG/M2

## 2025-07-08 DIAGNOSIS — I10 ESSENTIAL HYPERTENSION: ICD-10-CM

## 2025-07-08 DIAGNOSIS — K21.00 GASTROESOPHAGEAL REFLUX DISEASE WITH ESOPHAGITIS WITHOUT HEMORRHAGE: ICD-10-CM

## 2025-07-08 DIAGNOSIS — R94.31 NONSPECIFIC ABNORMAL ELECTROCARDIOGRAM (ECG) (EKG): ICD-10-CM

## 2025-07-08 DIAGNOSIS — J30.89 SEASONAL ALLERGIC RHINITIS DUE TO OTHER ALLERGIC TRIGGER: ICD-10-CM

## 2025-07-08 DIAGNOSIS — E78.5 DYSLIPIDEMIA: ICD-10-CM

## 2025-07-08 DIAGNOSIS — Z00.00 ANNUAL PHYSICAL EXAM: Primary | ICD-10-CM

## 2025-07-08 DIAGNOSIS — E66.01 SEVERE OBESITY (BMI 35.0-39.9) WITH COMORBIDITY: ICD-10-CM

## 2025-07-08 PROCEDURE — 99214 OFFICE O/P EST MOD 30 MIN: CPT | Mod: S$PBB,,,

## 2025-07-08 PROCEDURE — 99999 PR PBB SHADOW E&M-EST. PATIENT-LVL V: CPT | Mod: PBBFAC,,,

## 2025-07-08 PROCEDURE — 99215 OFFICE O/P EST HI 40 MIN: CPT | Mod: PBBFAC,PN

## 2025-07-08 RX ORDER — ATORVASTATIN CALCIUM 20 MG/1
20 TABLET, FILM COATED ORAL DAILY
Qty: 90 TABLET | Refills: 3 | Status: SHIPPED | OUTPATIENT
Start: 2025-07-08 | End: 2026-07-08

## 2025-07-08 RX ORDER — AZITHROMYCIN 250 MG/1
TABLET, FILM COATED ORAL
Qty: 6 TABLET | Refills: 0 | Status: SHIPPED | OUTPATIENT
Start: 2025-07-08 | End: 2025-07-13

## 2025-07-08 RX ORDER — OMEGA-3 FATTY ACIDS 1000 MG
1 CAPSULE ORAL DAILY
Qty: 90 CAPSULE | Refills: 3 | Status: SHIPPED | OUTPATIENT
Start: 2025-07-08 | End: 2026-07-08

## 2025-07-08 RX ORDER — ALBUTEROL SULFATE 90 UG/1
2 INHALANT RESPIRATORY (INHALATION) EVERY 6 HOURS PRN
Qty: 8.5 G | Refills: 2 | Status: SHIPPED | OUTPATIENT
Start: 2025-07-08

## 2025-07-08 RX ORDER — LABETALOL 100 MG/1
100 TABLET, FILM COATED ORAL 2 TIMES DAILY
Qty: 180 TABLET | Refills: 3 | Status: SHIPPED | OUTPATIENT
Start: 2025-07-08

## 2025-07-08 RX ORDER — IRBESARTAN 300 MG/1
300 TABLET ORAL DAILY
Qty: 90 TABLET | Refills: 3 | Status: SHIPPED | OUTPATIENT
Start: 2025-07-08

## 2025-07-08 RX ORDER — FLUTICASONE PROPIONATE 50 MCG
1 SPRAY, SUSPENSION (ML) NASAL 2 TIMES DAILY
Qty: 33.3 ML | Refills: 0 | Status: SHIPPED | OUTPATIENT
Start: 2025-07-08

## 2025-07-08 NOTE — ASSESSMENT & PLAN NOTE
BP elevated today in office.  Recommend /80 or less.  Patient reports stable BP at home  Continue irbesartan 300 mg daily and labetalol 100 mg BID

## 2025-07-08 NOTE — ASSESSMENT & PLAN NOTE
LBBB noted on EKG.  Ischemic evaluation not recently performed.  She declines this at this time as she denies anginal equivalent symptoms.

## 2025-07-08 NOTE — PROGRESS NOTES
Subjective:    Patient ID:  Erlinda Reaves is a 74 y.o. female patient here for evaluation Annual Exam (No issues stated by pt)      History of Present Illness    CHIEF COMPLAINT:  Patient presents today for follow-up.    HYPERTENSION:  She self-monitors BP at home twice weekly, with readings typically ranging between 110-130s systolic. She experiences white coat syndrome, noting today's elevated reading of 140/91 compared to her usual home measurements. She has BP monitoring devices in her kitchen and car. She reports fatigue from antihypertensives. She takes Lasix as needed, only in the morning.    CARDIAC:  She has a known LBBB present since at least 2023. She is currently unable to obtain recommended stress test due to financial constraints. She denies chest pain, dyspnea, lightheadedness, dizziness, swelling, palpitations, or near-syncope.    CURRENT SYMPTOMS:  She reports a scratchy throat sensation, chest congestion, and cough. She has been managing symptoms by gargling at night and using lozenges. She notes recent exposure to sick contacts.    SLEEP:  She experiences significant sleep disruption due to frequent nocturnal urination, occurring every 2 hours. She recently transitioned from Trazodone (previously taking quarter of a half pill) to Tylenol PM for sleep management. She acknowledges that nighttime urination is exacerbated by fluid intake.    DIET:  She attempts to limit salt intake.. She primarily drinks Diet Sprite throughout the day, which she reports helps settle her abdomen, and water at home. She only consumes diet cola occasionally when dining out.    CURRENT MEDICATIONS:  She takes Flonase one spray in each nostril nightly with saline nasal spray, Mucinex daily, [place], albuterol, and Lasix as needed in the morning.      ROS:  General: -fever, -chills, +fatigue, -weight gain, -weight loss  ENT: -ear pain, -nasal congestion, +sore throat, +sense of lump/mass in throat when  swallowing  Cardiovascular: -chest pain, -palpitations, -lower extremity edema  Respiratory: -cough, -shortness of breath  Gastrointestinal: -abdominal pain, -nausea, -vomiting, -diarrhea, -constipation, -blood in stool  Genitourinary: -dysuria, -hematuria, -frequency, +excessive urination  Musculoskeletal: -joint pain, -muscle pain  Skin: -rash, -lesion  Neurological: -headache, -dizziness, -numbness, -tingling  Psychiatric: -anxiety, -depression, -sleep difficulty                    Review of patient's allergies indicates:   Allergen Reactions    Doxycycline Nausea Only       Past Medical History:   Diagnosis Date    Chronic sinusitis     Diverticulosis of large intestine without perforation or abscess without bleeding 2023    Hyperlipemia     Hypertension     IBS (irritable bowel syndrome)     Insomnia     Osteoarthritis     Personal history of colonic polyps 2023     Past Surgical History:   Procedure Laterality Date     SECTION      COLONOSCOPY      COLONOSCOPY  2023    3 RECALL    TONSILLECTOMY       Social History[1]                Objective        Vitals:    25 1515   BP: (!) 140/91   Pulse: 70       LIPIDS - LAST 2   Lab Results   Component Value Date    CHOL 183 2025    CHOL 173 2024    HDL 76 2025    HDL 62 2024    LDLCALC 87 2025    LDLCALC 85 2024    TRIG 108 2025    TRIG 162 (H) 2024    CHOLHDL 2.4 2025    CHOLHDL 2.8 2024       CBC - LAST 2  Lab Results   Component Value Date    WBC 4.7 2025    WBC 4.3 2024    RBC 3.98 2025    RBC 4.09 2024    HGB 12.6 2025    HGB 12.3 2024    HCT 38.1 2025    HCT 39.5 2024    MCV 95.7 2025    MCV 96.6 2024    MCH 31.7 2025    MCH 30.1 2024    MCHC 33.1 2025    MCHC 31.1 (L) 2024    RDW 13.3 2025    RDW 13.8 2024     2025     2024    MPV 10.2 2025     "MPV 9.9 06/17/2024    LYMPH 1,636 03/19/2025    LYMPH 34.8 03/19/2025    MONO 385 03/19/2025    MONO 8.2 03/19/2025    BASO 42 03/19/2025    BASO 39 06/17/2024       CHEMISTRY & LIVER FUNCTION - LAST 2  Lab Results   Component Value Date     03/19/2025     06/17/2024    K 4.4 03/19/2025    K 4.2 06/17/2024     03/19/2025     06/17/2024    CO2 30 03/19/2025    CO2 31 06/17/2024    BUN 18 03/19/2025    BUN 19 06/17/2024    CREATININE 0.84 03/19/2025    CREATININE 0.79 06/17/2024    GLU 84 03/19/2025    GLU 93 06/17/2024    CALCIUM 9.8 03/19/2025    CALCIUM 10.0 06/17/2024    ALBUMIN 4.5 03/19/2025    ALBUMIN 4.4 06/17/2024    PROT 6.7 03/19/2025    PROT 6.6 06/17/2024    ALKPHOS 82 01/09/2023    ALT 22 03/19/2025    ALT 19 06/17/2024    AST 26 03/19/2025    AST 25 06/17/2024    BILITOT 0.7 03/19/2025    BILITOT 0.5 06/17/2024        CARDIAC PROFILE - LAST 2  No results found for: "BNP", "CPK", "CPKMB", "LDH", "TROPONINI", "TROPONINIHS"     COAGULATION - LAST 2  No results found for: "LABPT", "INR", "APTT"    ENDOCRINE & PSA - LAST 2  Lab Results   Component Value Date    TSH 5.70 (H) 06/17/2024        ECHOCARDIOGRAM RESULTS  Results for orders placed in visit on 11/07/19    Echo Color Flow Doppler? Yes    Interpretation Summary  · Normal left ventricular systolic function. The estimated ejection fraction is 60%  · Normal LV diastolic function.  · Mild concentric left ventricular hypertrophy.  · No wall motion abnormalities.  · Mild mitral regurgitation.  · Mild tricuspid regurgitation.  · Normal right ventricular systolic function.  · Mild left atrial enlargement.  · The estimated PA systolic pressure is 31 mm Hg      CURRENT/PREVIOUS VISIT EKG  Results for orders placed or performed in visit on 07/26/23   IN OFFICE EKG 12-LEAD (to [place])    Collection Time: 07/26/23  1:36 PM    Narrative    Test Reason : Z00.00,    Vent. Rate : 059 BPM     Atrial Rate : 059 BPM     P-R Int : 188 ms          QRS " Dur : 132 ms      QT Int : 426 ms       P-R-T Axes : 035 -47 080 degrees     QTc Int : 421 ms    Sinus bradycardia  Left bundle branch block  Abnormal ECG  No previous ECGs available  Confirmed by Osmar LI, Tre Holder ([id number]) on 7/26/2023 11:14:53 PM    Referred By:  AM           Confirmed By:Tre Prasad MD     No valid procedures specified.   Results for orders placed in visit on 11/13/19    Treadmill Stress Test    Interpretation Summary    The EKG portion of this study is negative for ischemia.    The patient reported no chest pain during the stress test.    Arrhythmias during stress: rare PVCs.    The blood pressure response to stress was normal.    ECG Stress Nuclear portion of this study will be reported separately.    No valid procedures specified.    Physical Exam    Vitals: Blood pressure: 140/91.  General: No acute distress. Well-developed. Well-nourished.  Eyes: EOMI. Sclerae anicteric.  HENT: Normocephalic. Atraumatic.   Cardiovascular: Regular rate. Regular rhythm. No murmurs. No rubs. No gallops. Normal S1, S2.  Respiratory: Normal respiratory effort. Clear to auscultation bilaterally. No rales. No rhonchi. No wheezing.  Abdomen: Soft. Non-tender. Non-distended. Normoactive bowel sounds.  Musculoskeletal: No  obvious deformity.  Extremities: No lower extremity edema.  Neurological: Alert & oriented x3. No slurred speech. Normal gait.  Psychiatric: Normal mood. Normal affect. Good insight. Good judgment.  Skin: Warm. Dry. No rash.         I HAVE REVIEWED :    The vital signs, nurses notes, and all the pertinent radiology and labs.        Current Outpatient Medications   Medication Instructions    albuterol (PROAIR HFA) 90 mcg/actuation inhaler 2 puffs, Inhalation, Every 6 hours PRN, Rescue    atorvastatin (LIPITOR) 20 mg, Oral, Daily    azithromycin (Z-JERRY) 250 MG tablet Take 2 tablets by mouth on day 1; Take 1 tablet by mouth on days 2-5      cholecalciferol (vitamin D3) (VITAMIN D3) 5,000  Units, Daily    cinnamon bark (CINNAMON) 500 mg, Daily    clobetasol 0.05% (TEMOVATE) 0.05 % Oint 1 Application, Daily    coenzyme Q10 (COQ-10) 100 mg, Daily    fluticasone propionate (FLONASE) 50 mcg, Each Nostril, 2 times daily    furosemide (LASIX) 20 mg, Oral, Daily PRN    garlic 100 mg Tab 1 tablet, Daily    guaiFENesin (MUCINEX) 1,200 mg, Daily PRN    irbesartan (AVAPRO) 300 mg, Oral, Daily    labetaloL (NORMODYNE) 100 mg, Oral, 2 times daily    LIDOcaine HCl-hydrocortison ac (LIDAMANTLE-HC) 3-0.5 % topical cream 1 packet, 2 times daily    loratadine (CLARITIN) 10 mg, Daily    magnesium oxide (MAG-OX) 400 mg, Daily    multivitamin (ONE DAILY MULTIVITAMIN) per tablet 1 tablet, Daily    omega-3 fatty acids 1,000 mg, Oral, Daily    omeprazole (PRILOSEC) 20 mg, Oral, Daily    polyethylene glycol (GLYCOLAX) 17 g, Daily    promethazine (PHENERGAN) 12.5 MG Tab 1 tablet, Daily PRN    RESTASIS 0.05 % ophthalmic emulsion 1 drop, 2 times daily    traZODone (DESYREL) 50 mg, Oral, Nightly PRN          Assessment & Plan   Assessment & Plan    E66.01 Severe obesity (BMI 35.0-39.9) with comorbidity  Z00.00 Annual physical exam  J30.89 Seasonal allergic rhinitis due to other allergic trigger  I10 Essential hypertension  E78.5 Dyslipidemia  K21.00 Gastroesophageal reflux disease with esophagitis without hemorrhage  R94.31 Nonspecific abnormal ECG (EKG)    PLAN SUMMARY:  - Maintain current Albuterol inhaler usage: Two puffs every 6 hours as needed for wheezing  - Lasix: Take in the morning as needed, not at night  - Azithromycin (Z-Haider): Wait a few days before starting, only if symptoms worsen  - Flonase: One spray in each nostril nightly  - Stress test and echocardiogram deferred due to financial constraints  - Follow up in 6 months to discuss potential medication adjustments and review blood pressure readings      SEASONAL ALLERGIC RHINITIS DUE TO OTHER ALLERGIC TRIGGER:  - Flonase: One spray in each nostril nightly.  -  Continue lozenges. Can take Mucinex as well.  - OTC zyrtec recommended  - If symptoms persist or worsen with concern for URI, Zpak sent to pharmacy    ESSENTIAL HYPERTENSION:  - Fatigue potentially attributed to blood pressure medications.  - Monitor blood pressure at home at least 2 times per week.  - Maintain current fluid intake, including water and diet sprite.  - Continue to limit salt intake.  - Bring blood pressure readings to next visit to discuss potential medication adjustments.  - Lasix: Take in the morning as needed, not at night.    NONSPECIFIC ABNORMAL ECG (EKG):  - LBBB on EKG consistent with previous findings from 2023.  - Deferred stress test and echocardiogram due to patient's financial constraints.  - Contact the office if experiencing chest pain, shortness of breath, or feeling faint.    OTHER/GENERAL:  - Educated on potential for viruses to cause respiratory symptoms and importance of allowing time for viral infections to resolve before starting antibiotics.  - Explained concept of antibiotic resistance and risks of overusing antibiotics for viral infections.  - Azithromycin (Z-Haider): Instructed to wait a few days before starting, only if symptoms worsen.  - Albuterol inhaler: Two puffs every 6 hours as needed for wheezing.  - Mucinex: Take daily as previously prescribed.           Gastroesophageal reflux disease  Stable. Avoid dietary triggers. On Omeprazole.    Severe obesity (BMI 35.0-39.9) with comorbidity  Calorie restrictive diet  Weight loss recommended      Essential hypertension  BP elevated today in office.  Recommend /80 or less.  Patient reports stable BP at home  Continue irbesartan 300 mg daily and labetalol 100 mg BID    Dyslipidemia  Continue Lipitor 20 mg nightly.  Continue with heart healthy low-fat low-cholesterol diet.  Exercise as tolerated.  Add Omega 3 fatty fish oil supplements.       Nonspecific abnormal electrocardiogram (ECG) (EKG)  LBBB noted on EKG.  Ischemic  evaluation not recently performed.  She declines this at this time as she denies anginal equivalent symptoms.         6 month follow up    This note was generated with the assistance of ambient listening technology. Verbal consent was obtained by the patient and accompanying visitor(s) for the recording of patient appointment to facilitate this note. I attest to having reviewed and edited the generated note for accuracy, though some syntax or spelling errors may persist. Please contact the author of this note for any clarification.             [1]   Social History  Tobacco Use    Smoking status: Never     Passive exposure: Never    Smokeless tobacco: Never   Substance Use Topics    Alcohol use: Never    Drug use: Never